# Patient Record
Sex: FEMALE | Race: WHITE | NOT HISPANIC OR LATINO | Employment: UNEMPLOYED | URBAN - METROPOLITAN AREA
[De-identification: names, ages, dates, MRNs, and addresses within clinical notes are randomized per-mention and may not be internally consistent; named-entity substitution may affect disease eponyms.]

---

## 2022-08-18 ENCOUNTER — HOSPITAL ENCOUNTER (EMERGENCY)
Facility: MEDICAL CENTER | Age: 47
End: 2022-08-18
Attending: EMERGENCY MEDICINE
Payer: MEDICAID

## 2022-08-18 VITALS
HEART RATE: 84 BPM | WEIGHT: 160.94 LBS | TEMPERATURE: 97.9 F | RESPIRATION RATE: 16 BRPM | DIASTOLIC BLOOD PRESSURE: 89 MMHG | HEIGHT: 64 IN | BODY MASS INDEX: 27.48 KG/M2 | SYSTOLIC BLOOD PRESSURE: 132 MMHG | OXYGEN SATURATION: 96 %

## 2022-08-18 DIAGNOSIS — N12 PYELONEPHRITIS: ICD-10-CM

## 2022-08-18 DIAGNOSIS — K72.10 CHRONIC LIVER FAILURE WITHOUT HEPATIC COMA (HCC): ICD-10-CM

## 2022-08-18 LAB
ALBUMIN SERPL BCP-MCNC: 4.1 G/DL (ref 3.2–4.9)
ALBUMIN/GLOB SERPL: 1.2 G/DL
ALP SERPL-CCNC: 126 U/L (ref 30–99)
ALT SERPL-CCNC: 59 U/L (ref 2–50)
AMMONIA PLAS-SCNC: 32 UMOL/L (ref 11–45)
ANION GAP SERPL CALC-SCNC: 16 MMOL/L (ref 7–16)
APPEARANCE UR: CLEAR
AST SERPL-CCNC: 158 U/L (ref 12–45)
BACTERIA #/AREA URNS HPF: ABNORMAL /HPF
BASOPHILS # BLD AUTO: 0.7 % (ref 0–1.8)
BASOPHILS # BLD: 0.08 K/UL (ref 0–0.12)
BILIRUB SERPL-MCNC: 1.4 MG/DL (ref 0.1–1.5)
BILIRUB UR QL STRIP.AUTO: NEGATIVE
BUN SERPL-MCNC: 4 MG/DL (ref 8–22)
CALCIUM SERPL-MCNC: 8.9 MG/DL (ref 8.5–10.5)
CHLORIDE SERPL-SCNC: 99 MMOL/L (ref 96–112)
CO2 SERPL-SCNC: 23 MMOL/L (ref 20–33)
COLOR UR: YELLOW
CREAT SERPL-MCNC: 0.62 MG/DL (ref 0.5–1.4)
EOSINOPHIL # BLD AUTO: 0.37 K/UL (ref 0–0.51)
EOSINOPHIL NFR BLD: 3.4 % (ref 0–6.9)
EPI CELLS #/AREA URNS HPF: ABNORMAL /HPF
ERYTHROCYTE [DISTWIDTH] IN BLOOD BY AUTOMATED COUNT: 60.9 FL (ref 35.9–50)
GFR SERPLBLD CREATININE-BSD FMLA CKD-EPI: 111 ML/MIN/1.73 M 2
GLOBULIN SER CALC-MCNC: 3.5 G/DL (ref 1.9–3.5)
GLUCOSE SERPL-MCNC: 98 MG/DL (ref 65–99)
GLUCOSE UR STRIP.AUTO-MCNC: NEGATIVE MG/DL
HCG SERPL QL: NEGATIVE
HCT VFR BLD AUTO: 41.8 % (ref 37–47)
HGB BLD-MCNC: 14.7 G/DL (ref 12–16)
HYALINE CASTS #/AREA URNS LPF: ABNORMAL /LPF
IMM GRANULOCYTES # BLD AUTO: 0.04 K/UL (ref 0–0.11)
IMM GRANULOCYTES NFR BLD AUTO: 0.4 % (ref 0–0.9)
KETONES UR STRIP.AUTO-MCNC: NEGATIVE MG/DL
LEUKOCYTE ESTERASE UR QL STRIP.AUTO: ABNORMAL
LIPASE SERPL-CCNC: 25 U/L (ref 11–82)
LYMPHOCYTES # BLD AUTO: 4.36 K/UL (ref 1–4.8)
LYMPHOCYTES NFR BLD: 40 % (ref 22–41)
MCH RBC QN AUTO: 36.8 PG (ref 27–33)
MCHC RBC AUTO-ENTMCNC: 35.2 G/DL (ref 33.6–35)
MCV RBC AUTO: 104.8 FL (ref 81.4–97.8)
MICRO URNS: ABNORMAL
MONOCYTES # BLD AUTO: 0.82 K/UL (ref 0–0.85)
MONOCYTES NFR BLD AUTO: 7.5 % (ref 0–13.4)
NEUTROPHILS # BLD AUTO: 5.23 K/UL (ref 2–7.15)
NEUTROPHILS NFR BLD: 48 % (ref 44–72)
NITRITE UR QL STRIP.AUTO: NEGATIVE
NRBC # BLD AUTO: 0 K/UL
NRBC BLD-RTO: 0 /100 WBC
PH UR STRIP.AUTO: 6.5 [PH] (ref 5–8)
PLATELET # BLD AUTO: 207 K/UL (ref 164–446)
PMV BLD AUTO: 10.5 FL (ref 9–12.9)
POTASSIUM SERPL-SCNC: 3.2 MMOL/L (ref 3.6–5.5)
PROT SERPL-MCNC: 7.6 G/DL (ref 6–8.2)
PROT UR QL STRIP: NEGATIVE MG/DL
RBC # BLD AUTO: 3.99 M/UL (ref 4.2–5.4)
RBC # URNS HPF: ABNORMAL /HPF
RBC UR QL AUTO: ABNORMAL
SODIUM SERPL-SCNC: 138 MMOL/L (ref 135–145)
SP GR UR STRIP.AUTO: 1
UROBILINOGEN UR STRIP.AUTO-MCNC: 0.2 MG/DL
WBC # BLD AUTO: 10.9 K/UL (ref 4.8–10.8)
WBC #/AREA URNS HPF: ABNORMAL /HPF

## 2022-08-18 PROCEDURE — A9270 NON-COVERED ITEM OR SERVICE: HCPCS | Performed by: EMERGENCY MEDICINE

## 2022-08-18 PROCEDURE — 96374 THER/PROPH/DIAG INJ IV PUSH: CPT

## 2022-08-18 PROCEDURE — 700102 HCHG RX REV CODE 250 W/ 637 OVERRIDE(OP): Performed by: EMERGENCY MEDICINE

## 2022-08-18 PROCEDURE — 99285 EMERGENCY DEPT VISIT HI MDM: CPT

## 2022-08-18 PROCEDURE — 84703 CHORIONIC GONADOTROPIN ASSAY: CPT

## 2022-08-18 PROCEDURE — 87077 CULTURE AEROBIC IDENTIFY: CPT

## 2022-08-18 PROCEDURE — 81001 URINALYSIS AUTO W/SCOPE: CPT

## 2022-08-18 PROCEDURE — 80053 COMPREHEN METABOLIC PANEL: CPT

## 2022-08-18 PROCEDURE — 87086 URINE CULTURE/COLONY COUNT: CPT

## 2022-08-18 PROCEDURE — 700111 HCHG RX REV CODE 636 W/ 250 OVERRIDE (IP): Mod: JW | Performed by: EMERGENCY MEDICINE

## 2022-08-18 PROCEDURE — 83690 ASSAY OF LIPASE: CPT

## 2022-08-18 PROCEDURE — 82140 ASSAY OF AMMONIA: CPT

## 2022-08-18 PROCEDURE — 85025 COMPLETE CBC W/AUTO DIFF WBC: CPT

## 2022-08-18 PROCEDURE — 36415 COLL VENOUS BLD VENIPUNCTURE: CPT

## 2022-08-18 PROCEDURE — 96375 TX/PRO/DX INJ NEW DRUG ADDON: CPT

## 2022-08-18 PROCEDURE — 87186 SC STD MICRODIL/AGAR DIL: CPT

## 2022-08-18 RX ORDER — CEFDINIR 300 MG/1
300 CAPSULE ORAL 2 TIMES DAILY
Qty: 20 CAPSULE | Refills: 0 | Status: ON HOLD | OUTPATIENT
Start: 2022-08-18 | End: 2022-09-15

## 2022-08-18 RX ORDER — POTASSIUM CHLORIDE 20 MEQ/1
40 TABLET, EXTENDED RELEASE ORAL DAILY
Status: DISCONTINUED | OUTPATIENT
Start: 2022-08-18 | End: 2022-08-18 | Stop reason: HOSPADM

## 2022-08-18 RX ORDER — CEFTRIAXONE 1 G/1
1 INJECTION, POWDER, FOR SOLUTION INTRAMUSCULAR; INTRAVENOUS ONCE
Status: COMPLETED | OUTPATIENT
Start: 2022-08-18 | End: 2022-08-18

## 2022-08-18 RX ORDER — KETOROLAC TROMETHAMINE 30 MG/ML
15 INJECTION, SOLUTION INTRAMUSCULAR; INTRAVENOUS ONCE
Status: COMPLETED | OUTPATIENT
Start: 2022-08-18 | End: 2022-08-18

## 2022-08-18 RX ORDER — ACETAMINOPHEN 325 MG/1
650 TABLET ORAL ONCE
Status: COMPLETED | OUTPATIENT
Start: 2022-08-18 | End: 2022-08-18

## 2022-08-18 RX ADMIN — CEFTRIAXONE SODIUM 1 G: 1 INJECTION, POWDER, FOR SOLUTION INTRAMUSCULAR; INTRAVENOUS at 12:13

## 2022-08-18 RX ADMIN — ACETAMINOPHEN 650 MG: 325 TABLET, FILM COATED ORAL at 11:56

## 2022-08-18 RX ADMIN — KETOROLAC TROMETHAMINE 15 MG: 30 INJECTION, SOLUTION INTRAMUSCULAR at 12:14

## 2022-08-18 RX ADMIN — POTASSIUM CHLORIDE 40 MEQ: 1500 TABLET, EXTENDED RELEASE ORAL at 11:56

## 2022-08-18 NOTE — ED NOTES
"Pt eloped to lobby and was sitting near check in. Patient escorted back to room. Per patient\" I went to the bathroom and couldn't find my way back \"  "

## 2022-08-18 NOTE — ED PROVIDER NOTES
ED Provider Note    Scribed for Janice Arango M.D. by Samanta Cazares. 8/18/2022, 11:33 AM.    Primary care provider: None.  Means of arrival: Walk in   History obtained from: patient   History limited by: none    CHIEF COMPLAINT  Chief Complaint   Patient presents with    Abdominal Pain    Flank Pain     Left flank pain x 1 week     Urinary Pain     odor    N/V       HPI  Hien Huynh is a 47 y.o. female who presents to the Emergency Department for flank pain onset 1-2 weeks ago. Her pain is located to bilateral flanks, but is worse on the left. She has associated foul smelling urine, vomiting, and abdominal pain. Denies fevers. Patient has a history of acute renal failure and states her mother and brother passed from real failure. 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 Patient is not currently taking any of regular medications since moving here from [place]. Endorses drinking alcohol and occasional marijuana. Denies drug use.     REVIEW OF SYSTEMS  Pertinent positives include flank pain, foul smelling urine, vomiting, and abdominal pain. Pertinent negatives include no fevers.  All other systems reviewed and negative.    PAST MEDICAL HISTORY   has a past medical history of Cirrhosis (HCC), Liver disease, and Renal disorder.    SURGICAL HISTORY  patient denies any surgical history    SOCIAL HISTORY  Social History     Tobacco Use    Smoking status: Some Days     Types: Cigarettes    Smokeless tobacco: Never   Vaping Use    Vaping Use: Never used   Substance Use Topics    Alcohol use: Yes     Alcohol/week: 7.2 oz     Types: 12 Glasses of wine per week     Comment: daily etoh    Drug use: Yes     Comment: occ marijuana      Social History     Substance and Sexual Activity   Drug Use Yes    Comment: occ marijuana       FAMILY HISTORY  History reviewed. No pertinent family history.    CURRENT MEDICATIONS  Home Medications       Reviewed by Rebecca SMITH  "DORCAS Weeks (Registered Nurse) on 08/18/22 at 1057  Med List Status: Complete     Medication Last Dose Status        Patient Irving Taking any Medications                           ALLERGIES  Allergies   Allergen Reactions    Penicillins Hives       PHYSICAL EXAM  VITAL SIGNS: BP (!) 141/89   Pulse 99   Temp 36.2 °C (97.2 °F) (Temporal)   Resp 18   Ht 1.626 m (5' 4\")   Wt 73 kg (160 lb 15 oz)   LMP 08/03/2022   SpO2 95%   BMI 27.62 kg/m²   Constitutional: Alert in no apparent distress.  HENT: No signs of trauma, Bilateral external ears normal, Nose normal.   Eyes: Pupils are equal and reactive, Conjunctiva normal, Non-icteric.   Neck: No stridor.   Cardiovascular: Regular rate and rhythm, Mild systolic murmur.  Thorax & Lungs: Normal breath sounds, No respiratory distress, No wheezing, No chest tenderness.   Abdomen: Bowel sounds normal, Soft, No tenderness, No masses, No peritoneal signs.  Skin: Warm, Dry, No erythema, No rash, no jaundice.  Back: No bony tenderness, minimal CVA tenderness.  Musculoskeletal:  No major deformities noted.  Neurologic: Alert, moving all extremities without difficulty, no focal deficits.    LABS  Labs Reviewed   CBC WITH DIFFERENTIAL - Abnormal; Notable for the following components:       Result Value    WBC 10.9 (*)     RBC 3.99 (*)     .8 (*)     MCH 36.8 (*)     MCHC 35.2 (*)     RDW 60.9 (*)     All other components within normal limits   COMP METABOLIC PANEL - Abnormal; Notable for the following components:    Potassium 3.2 (*)     Bun 4 (*)     AST(SGOT) 158 (*)     ALT(SGPT) 59 (*)     Alkaline Phosphatase 126 (*)     All other components within normal limits   URINALYSIS,CULTURE IF INDICATED - Abnormal; Notable for the following components:    Leukocyte Esterase Large (*)     Occult Blood Trace (*)     All other components within normal limits    Narrative:     Indication for culture:->Patient WITHOUT an indwelling Schulte  catheter in place with new onset of " Dysuria, Frequency,  Urgency, and/or Suprapubic pain   URINE MICROSCOPIC (W/UA) - Abnormal; Notable for the following components:    WBC 20-50 (*)     Bacteria Many (*)     All other components within normal limits    Narrative:     Indication for culture:->Patient WITHOUT an indwelling Schulte  catheter in place with new onset of Dysuria, Frequency,  Urgency, and/or Suprapubic pain   LIPASE   HCG QUAL SERUM   AMMONIA   ESTIMATED GFR   URINE CULTURE(NEW)    Narrative:     Indication for culture:->Patient WITHOUT an indwelling Schulte  catheter in place with new onset of Dysuria, Frequency,  Urgency, and/or Suprapubic pain   URINE DRUG SCREEN     All labs reviewed by me.    COURSE & MEDICAL DECISION MAKING  Pertinent Labs & Imaging studies reviewed. (See chart for details)    Differential diagnoses include but are not limited to: pyelonephritis, liver failure    11:33 AM - Patient seen and examined at bedside. I informed the patient she has a urine infection which will require treatment with IV antibiotics. We will attempt to obtain her records from prior hospitals to determine the best plan of care. Patient will be treated with Toradol 15 mg, tylenol 650 mg, and rocephin 2g. Ordered UDS, ammonia, CBC w/ diff, CMP, lipase, HCG qual, UA, and urine culture to evaluate her symptoms.     1:22 PM - Patient was reevaluated at bedside. Discussed lab results with the patient. Recommended ibuprofen for pain at home. Advised follow up with a PCP which I provided her with a referral for. Prescribed omnicef for her UTI. Discussed return precaution. Patient will be discharged at this time. She verbalizes agreement with discharge and plan of care.     Decision Making:  This is a 47 y.o. year old female who presents with back pain.  Patient has a complicated past medical history and is now in this area without any primary care or medications.  Work-up today reveals normal normal ammonia.  Minimally elevated LFTs consistent with her  history of chronic liver disease.  Lipase is normal urinalysis is indicative of UTI she was given antibiotics for this.  She tolerated Rocephin without difficulty she will be given Omnicef for antibiotic coverage.  She has no evidence of renal failure at this time.  She will be treated for pyelonephritis.  Records were able to be obtained from the outside facility however no current medication list was included in that paperwork.  Patient will be referred referred to outpatient primary care to restart regular medications.  She is agreeable to this plan.    The patient will return for new or worsening symptoms and is stable at the time of discharge. Patient was given return precautions. Patient and/or family member verbalizes understanding and will comply.    DISPOSITION:  Patient will be discharged home in stable condition.    FOLLOW UP:  [facility]  [address]  [facility] [address] [phone]  Follow up  Please call [facility] to establish with a Primary Care Provider. This office offers sliding fee scales based on income. Thank you.    [facility], Emergency Dept  [address] [facility] [address] [phone]    Return to the emergency department for worsening pain, fevers or other concerns.      OUTPATIENT MEDICATIONS:  Discharge Medication List as of 8/18/2022  1:45 PM        START taking these medications    Details   cefdinir (OMNICEF) 300 MG Cap Take 1 Capsule by mouth 2 times a day., Disp-20 Capsule, R-0, Normal              FINAL IMPRESSION  1. Chronic liver failure without hepatic coma (HCC)    2. Pyelonephritis           This dictation has been created using voice recognition software and/or scribes. The accuracy of the dictation is limited by the abilities of the software and the expertise of the scribes. I expect there may be some errors of grammar and possibly content. I made every attempt to manually correct the errors within my  dictation. However, errors related to voice recognition software and/or scribes may still exist and should be interpreted within the appropriate context.     I, Samanta Cazares (Scribe), am scribing for, and in the presence of, Janice Arango M.D..    Electronically signed by: Samanta Cazares (Scribe), 8/18/2022    IJanice M.D. personally performed the services described in this documentation, as scribed by Samanta Cazares in my presence, and it is both accurate and complete.    The note accurately reflects work and decisions made by me.  Janice Arango M.D.  8/18/2022  3:10 PM

## 2022-08-18 NOTE — ED NOTES
Pt self ambulated to treatment area with out difficulty. Pt is sitting upright in chair with no needs at this time. Chart up for ERP.

## 2022-08-18 NOTE — LETTER
8/22/2022               Hien Huynh  [address] [place] [id number]        Dear Hien (MR#[id number])    This letter is sent in regards to your recent visit to the [facility] Emergency Department on 8/18/2022. During the visit, tests were performed to assist the physician in your medical diagnosis. A review of your tests requires that we notify you of the following:    Your urine culture was POSITIVE for a bacteria called Escherichia coli. The antibiotic prescribed for you (cefdinir) should be active to treat this bacteria. It is important that you continue taking your antibiotic until it is finished.     Please feel free to contact me at the number below if you have any questions or concerns. Thank you for your cooperation in the matter.    Sincerely,  ED Culture Follow-Up Staff  Hien Sanchez, PharmD    [facility], Emergency Department  [address] [place] [id number] [phone] (ED Culture Line)

## 2022-08-18 NOTE — ED NOTES
PIV removed by patient. No bleeding noted.   DC home with friend with written and verbal instructions regarding f/u, activity and RX to  at pharmacy. Provided referrals, phone number updated in chart.  Verbalized understanding of all instructions, no further questions, ambulated out of ED with a steady gait with all belongings.

## 2022-08-18 NOTE — ED TRIAGE NOTES
.  Chief Complaint   Patient presents with    Abdominal Pain    Flank Pain     Left flank pain x 1 week     Urinary Pain     odor    N/V     Hepatic encephalopathy liver failure, has not been taking medications x 1 month d/t moving and traveling. Pt reports recently placed on hospice when in [place] but has since moved here with a friend. Unsure of meds last filled in [place] at a [facility] pt unsure of medications and states problems with confusion and aggression.

## 2022-08-20 LAB
BACTERIA UR CULT: ABNORMAL
SIGNIFICANT IND 70042: ABNORMAL
SITE SITE: ABNORMAL
SOURCE SOURCE: ABNORMAL

## 2022-08-22 NOTE — ED NOTES
"ED Positive Culture Follow-up/Notification Note:    Date: 8/22/22     Patient seen in the ED on 8/18/2022 for flank pain for 1-2 weeks with foul smelling urine and vomiting.   1. Chronic liver failure without hepatic coma (HCC)    2. Pyelonephritis     Given Rocephin 1 g IV in the ED.   Discharge Medication List as of 8/18/2022  1:45 PM        START taking these medications    Details   cefdinir (OMNICEF) 300 MG Cap Take 1 Capsule by mouth 2 times a day., Disp-20 Capsule, R-0, Normal             Allergies: Penicillins     Vitals:    08/18/22 1038 08/18/22 1244 08/18/22 1345   BP: (!) 141/89 (!) 137/90 132/89   Pulse: 99 79 84   Resp: 18 16 16   Temp: 36.2 °C (97.2 °F)  36.6 °C (97.9 °F)   TempSrc: Temporal  Temporal   SpO2: 95% 97% 96%   Weight: 73 kg (160 lb 15 oz)     Height: 1.626 m (5' 4\")         Final cultures:   Results       Procedure Component Value Units Date/Time    URINE CULTURE(NEW) [[id number]]  (Abnormal)  (Susceptibility) Collected: 08/18/22 1046    Order Status: Completed Specimen: Urine Updated: 08/20/22 0941     Significant Indicator POS     Source UR     Site -     Culture Result -      Streptococcus agalactiae (Group B)  ,000 cfu/mL        Escherichia coli  ,000 cfu/mL      Narrative:      Indication for culture:->Patient WITHOUT an indwelling Schulte  catheter in place with new onset of Dysuria, Frequency,  Urgency, and/or Suprapubic pain    Susceptibility       Escherichia coli (1)       Antibiotic Interpretation Microscan   Method Status      Amikacin  [*]  Sensitive <=16 mcg/mL ELSIE Final     Ampicillin Resistant >16 mcg/mL ELSIE Final     Amoxicillin/CA  [*]  Sensitive <=8/4 mcg/mL ELSIE Final     Aztreonam  [*]  Sensitive <=4 mcg/mL ELSIE Final     Ceftolozane+Tazobactam  [*]  Sensitive <=2 mcg/mL ELSIE Final     Ceftriaxone Sensitive <=1 mcg/mL ELSIE Final     Ceftazidime  [*]  Sensitive <=1 mcg/mL ELSIE Final     Cefazolin Sensitive <=2 mcg/mL ELSIE Final      Breakpoints when Cefazolin is " used for therapy of infections  other than uncomplicated UTIs due to Enterobacterales are as  follows:  ELSIE and Interpretation:  <=2 S  4 I  >=8 R          Ciprofloxacin Sensitive <=0.25 mcg/mL ELSIE Final     Cefepime Sensitive <=2 mcg/mL ELSIE Final     Cefuroxime Sensitive <=4 mcg/mL ELSIE Final     Ceftazidime+Avibactam  [*]  Sensitive <=4 mcg/mL ELSIE Final     Ampicillin/sulbactam Sensitive 8/4 mcg/mL ELSIE Final     Ertapenem  [*]  Sensitive <=0.5 mcg/mL ELSIE Final     Tobramycin Sensitive 4 mcg/mL ELSIE Final     Nitrofurantoin Sensitive <=32 mcg/mL ELSIE Final     Gentamicin Resistant >8 mcg/mL ELSIE Final     Imipenem  [*]  Sensitive <=1 mcg/mL ELSIE Final     Levofloxacin Sensitive <=0.5 mcg/mL ELSIE Final     Meropenem  [*]  Sensitive <=1 mcg/mL ELSIE Final     Meropenem/Vaborbactam  [*]  Sensitive <=2 mcg/mL ELSIE Final     Minocycline Sensitive <=4 mcg/mL ELSIE Final     Pip/Tazobactam Sensitive <=8 mcg/mL ELSIE Final     Trimeth/Sulfa Resistant >2/38 mcg/mL ELSIE Final     Tetracycline  [*]  Resistant >8 mcg/mL ELSIE Final     Tigecycline Sensitive <=2 mcg/mL ELSIE Final              [*]  Suppressed Antibiotic                   URINALYSIS,CULTURE IF INDICATED [[id number]]  (Abnormal) Collected: 08/18/22 1046    Order Status: Completed Specimen: Blood Updated: 08/18/22 1120     Color Yellow     Character Clear     Specific Gravity 1.005     Ph 6.5     Glucose Negative mg/dL      Ketones Negative mg/dL      Protein Negative mg/dL      Bilirubin Negative     Urobilinogen, Urine 0.2     Nitrite Negative     Leukocyte Esterase Large     Occult Blood Trace     Micro Urine Req Microscopic    Narrative:      Indication for culture:->Patient WITHOUT an indwelling Schulte  catheter in place with new onset of Dysuria, Frequency,  Urgency, and/or Suprapubic pain            Plan:   Appropriate antibiotic therapy prescribed. No changes required based upon culture result.  Sent letter to patient to notify of positive culture result and encourage  compliance with prescribed antibiotics.     Hien Sanchez, PharmD

## 2022-09-09 ENCOUNTER — APPOINTMENT (OUTPATIENT)
Dept: RADIOLOGY | Facility: MEDICAL CENTER | Age: 47
DRG: 441 | End: 2022-09-09
Attending: EMERGENCY MEDICINE
Payer: COMMERCIAL

## 2022-09-09 ENCOUNTER — HOSPITAL ENCOUNTER (INPATIENT)
Facility: MEDICAL CENTER | Age: 47
LOS: 6 days | DRG: 441 | End: 2022-09-15
Attending: EMERGENCY MEDICINE | Admitting: INTERNAL MEDICINE
Payer: COMMERCIAL

## 2022-09-09 DIAGNOSIS — K76.82 HEPATIC ENCEPHALOPATHY (HCC): ICD-10-CM

## 2022-09-09 DIAGNOSIS — R10.11 RIGHT UPPER QUADRANT ABDOMINAL PAIN: ICD-10-CM

## 2022-09-09 DIAGNOSIS — I10 HYPERTENSION, UNSPECIFIED TYPE: ICD-10-CM

## 2022-09-09 DIAGNOSIS — K72.00 ACUTE LIVER FAILURE WITHOUT HEPATIC COMA: ICD-10-CM

## 2022-09-09 DIAGNOSIS — E86.0 DEHYDRATION: ICD-10-CM

## 2022-09-09 DIAGNOSIS — K70.10 ALCOHOLIC HEPATITIS WITHOUT ASCITES: ICD-10-CM

## 2022-09-09 PROBLEM — E87.29 HIGH ANION GAP METABOLIC ACIDOSIS: Status: ACTIVE | Noted: 2022-09-09

## 2022-09-09 PROBLEM — R10.9 ABDOMINAL PAIN: Status: ACTIVE | Noted: 2022-09-09

## 2022-09-09 PROBLEM — R11.2 NAUSEA AND VOMITING: Status: ACTIVE | Noted: 2022-09-09

## 2022-09-09 LAB
ALBUMIN SERPL BCP-MCNC: 4.2 G/DL (ref 3.2–4.9)
ALBUMIN/GLOB SERPL: 1.2 G/DL
ALP SERPL-CCNC: 123 U/L (ref 30–99)
ALT SERPL-CCNC: 93 U/L (ref 2–50)
AMMONIA PLAS-SCNC: 64 UMOL/L (ref 11–45)
AMPHET UR QL SCN: NEGATIVE
ANION GAP SERPL CALC-SCNC: 33 MMOL/L (ref 7–16)
ANISOCYTOSIS BLD QL SMEAR: ABNORMAL
AST SERPL-CCNC: 314 U/L (ref 12–45)
B-OH-BUTYR SERPL-MCNC: >8 MMOL/L (ref 0.02–0.27)
BARBITURATES UR QL SCN: NEGATIVE
BASOPHILS # BLD AUTO: 1.1 % (ref 0–1.8)
BASOPHILS # BLD: 0.09 K/UL (ref 0–0.12)
BENZODIAZ UR QL SCN: NEGATIVE
BILIRUB CONJ SERPL-MCNC: 4.3 MG/DL (ref 0.1–0.5)
BILIRUB SERPL-MCNC: 5.9 MG/DL (ref 0.1–1.5)
BLOOD CULTURE HOLD CXBCH: NORMAL
BUN SERPL-MCNC: 6 MG/DL (ref 8–22)
BZE UR QL SCN: NEGATIVE
CALCIUM SERPL-MCNC: 8.5 MG/DL (ref 8.5–10.5)
CANNABINOIDS UR QL SCN: POSITIVE
CHLORIDE SERPL-SCNC: 94 MMOL/L (ref 96–112)
CO2 SERPL-SCNC: 11 MMOL/L (ref 20–33)
COMMENT 1642: NORMAL
CREAT SERPL-MCNC: 0.47 MG/DL (ref 0.5–1.4)
EOSINOPHIL # BLD AUTO: 0.02 K/UL (ref 0–0.51)
EOSINOPHIL NFR BLD: 0.2 % (ref 0–6.9)
ERYTHROCYTE [DISTWIDTH] IN BLOOD BY AUTOMATED COUNT: 66.8 FL (ref 35.9–50)
ETHANOL BLD-MCNC: <10.1 MG/DL
GFR SERPLBLD CREATININE-BSD FMLA CKD-EPI: 119 ML/MIN/1.73 M 2
GGT SERPL-CCNC: 568 U/L (ref 7–34)
GLOBULIN SER CALC-MCNC: 3.4 G/DL (ref 1.9–3.5)
GLUCOSE BLD STRIP.AUTO-MCNC: 56 MG/DL (ref 65–99)
GLUCOSE BLD STRIP.AUTO-MCNC: 73 MG/DL (ref 65–99)
GLUCOSE SERPL-MCNC: 71 MG/DL (ref 65–99)
HAV IGM SERPL QL IA: NORMAL
HBV CORE IGM SER QL: NORMAL
HBV SURFACE AG SER QL: NORMAL
HCT VFR BLD AUTO: 33.2 % (ref 37–47)
HCV AB SER QL: NORMAL
HGB BLD-MCNC: 11.2 G/DL (ref 12–16)
IMM GRANULOCYTES # BLD AUTO: 0.03 K/UL (ref 0–0.11)
IMM GRANULOCYTES NFR BLD AUTO: 0.4 % (ref 0–0.9)
INR PPP: 1.24 (ref 0.87–1.13)
LACTATE SERPL-SCNC: 2.4 MMOL/L (ref 0.5–2)
LIPASE SERPL-CCNC: 64 U/L (ref 11–82)
LYMPHOCYTES # BLD AUTO: 1.27 K/UL (ref 1–4.8)
LYMPHOCYTES NFR BLD: 15.5 % (ref 22–41)
MACROCYTES BLD QL SMEAR: ABNORMAL
MCH RBC QN AUTO: 38.8 PG (ref 27–33)
MCHC RBC AUTO-ENTMCNC: 33.7 G/DL (ref 33.6–35)
MCV RBC AUTO: 114.9 FL (ref 81.4–97.8)
METHADONE UR QL SCN: NEGATIVE
MONOCYTES # BLD AUTO: 0.59 K/UL (ref 0–0.85)
MONOCYTES NFR BLD AUTO: 7.2 % (ref 0–13.4)
MORPHOLOGY BLD-IMP: NORMAL
NEUTROPHILS # BLD AUTO: 6.18 K/UL (ref 2–7.15)
NEUTROPHILS NFR BLD: 75.6 % (ref 44–72)
NRBC # BLD AUTO: 0 K/UL
NRBC BLD-RTO: 0 /100 WBC
OPIATES UR QL SCN: POSITIVE
OXYCODONE UR QL SCN: NEGATIVE
PCP UR QL SCN: NEGATIVE
PLATELET # BLD AUTO: 194 K/UL (ref 164–446)
PLATELET BLD QL SMEAR: NORMAL
PMV BLD AUTO: 11.5 FL (ref 9–12.9)
POTASSIUM SERPL-SCNC: 3.8 MMOL/L (ref 3.6–5.5)
PROPOXYPH UR QL SCN: NEGATIVE
PROT SERPL-MCNC: 7.6 G/DL (ref 6–8.2)
PROTHROMBIN TIME: 15.4 SEC (ref 12–14.6)
RBC # BLD AUTO: 2.89 M/UL (ref 4.2–5.4)
RBC BLD AUTO: PRESENT
SODIUM SERPL-SCNC: 138 MMOL/L (ref 135–145)
WBC # BLD AUTO: 8.2 K/UL (ref 4.8–10.8)

## 2022-09-09 PROCEDURE — 80053 COMPREHEN METABOLIC PANEL: CPT

## 2022-09-09 PROCEDURE — 82010 KETONE BODYS QUAN: CPT

## 2022-09-09 PROCEDURE — 80307 DRUG TEST PRSMV CHEM ANLYZR: CPT

## 2022-09-09 PROCEDURE — 83883 ASSAY NEPHELOMETRY NOT SPEC: CPT

## 2022-09-09 PROCEDURE — 76705 ECHO EXAM OF ABDOMEN: CPT

## 2022-09-09 PROCEDURE — 99223 1ST HOSP IP/OBS HIGH 75: CPT | Performed by: INTERNAL MEDICINE

## 2022-09-09 PROCEDURE — 84450 TRANSFERASE (AST) (SGOT): CPT

## 2022-09-09 PROCEDURE — 36415 COLL VENOUS BLD VENIPUNCTURE: CPT

## 2022-09-09 PROCEDURE — 82140 ASSAY OF AMMONIA: CPT

## 2022-09-09 PROCEDURE — 84460 ALANINE AMINO (ALT) (SGPT): CPT

## 2022-09-09 PROCEDURE — 83605 ASSAY OF LACTIC ACID: CPT

## 2022-09-09 PROCEDURE — 96374 THER/PROPH/DIAG INJ IV PUSH: CPT

## 2022-09-09 PROCEDURE — 85049 AUTOMATED PLATELET COUNT: CPT

## 2022-09-09 PROCEDURE — 80074 ACUTE HEPATITIS PANEL: CPT

## 2022-09-09 PROCEDURE — 83690 ASSAY OF LIPASE: CPT

## 2022-09-09 PROCEDURE — 85610 PROTHROMBIN TIME: CPT

## 2022-09-09 PROCEDURE — 700105 HCHG RX REV CODE 258: Performed by: INTERNAL MEDICINE

## 2022-09-09 PROCEDURE — 700111 HCHG RX REV CODE 636 W/ 250 OVERRIDE (IP): Performed by: INTERNAL MEDICINE

## 2022-09-09 PROCEDURE — 82962 GLUCOSE BLOOD TEST: CPT

## 2022-09-09 PROCEDURE — 85025 COMPLETE CBC W/AUTO DIFF WBC: CPT

## 2022-09-09 PROCEDURE — 82248 BILIRUBIN DIRECT: CPT

## 2022-09-09 PROCEDURE — 700102 HCHG RX REV CODE 250 W/ 637 OVERRIDE(OP): Performed by: INTERNAL MEDICINE

## 2022-09-09 PROCEDURE — 82077 ASSAY SPEC XCP UR&BREATH IA: CPT

## 2022-09-09 PROCEDURE — 99285 EMERGENCY DEPT VISIT HI MDM: CPT

## 2022-09-09 PROCEDURE — 82977 ASSAY OF GGT: CPT

## 2022-09-09 PROCEDURE — A9270 NON-COVERED ITEM OR SERVICE: HCPCS | Performed by: INTERNAL MEDICINE

## 2022-09-09 PROCEDURE — 84520 ASSAY OF UREA NITROGEN: CPT

## 2022-09-09 PROCEDURE — 700105 HCHG RX REV CODE 258: Performed by: EMERGENCY MEDICINE

## 2022-09-09 PROCEDURE — 700111 HCHG RX REV CODE 636 W/ 250 OVERRIDE (IP): Performed by: EMERGENCY MEDICINE

## 2022-09-09 PROCEDURE — 770001 HCHG ROOM/CARE - MED/SURG/GYN PRIV*

## 2022-09-09 PROCEDURE — 96375 TX/PRO/DX INJ NEW DRUG ADDON: CPT

## 2022-09-09 RX ORDER — SODIUM CHLORIDE 9 MG/ML
1000 INJECTION, SOLUTION INTRAVENOUS ONCE
Status: COMPLETED | OUTPATIENT
Start: 2022-09-09 | End: 2022-09-09

## 2022-09-09 RX ORDER — POLYETHYLENE GLYCOL 3350 17 G/17G
1 POWDER, FOR SOLUTION ORAL
Status: DISCONTINUED | OUTPATIENT
Start: 2022-09-09 | End: 2022-09-15 | Stop reason: HOSPADM

## 2022-09-09 RX ORDER — AMLODIPINE BESYLATE 5 MG/1
5 TABLET ORAL
Status: DISCONTINUED | OUTPATIENT
Start: 2022-09-10 | End: 2022-09-15 | Stop reason: HOSPADM

## 2022-09-09 RX ORDER — MORPHINE SULFATE 4 MG/ML
4 INJECTION INTRAVENOUS ONCE
Status: COMPLETED | OUTPATIENT
Start: 2022-09-09 | End: 2022-09-09

## 2022-09-09 RX ORDER — ONDANSETRON 2 MG/ML
4 INJECTION INTRAMUSCULAR; INTRAVENOUS ONCE
Status: COMPLETED | OUTPATIENT
Start: 2022-09-09 | End: 2022-09-09

## 2022-09-09 RX ORDER — OXYCODONE HYDROCHLORIDE 5 MG/1
5 TABLET ORAL
Status: DISCONTINUED | OUTPATIENT
Start: 2022-09-09 | End: 2022-09-15 | Stop reason: HOSPADM

## 2022-09-09 RX ORDER — LACTULOSE 20 G/30ML
30 SOLUTION ORAL 3 TIMES DAILY
Status: DISCONTINUED | OUTPATIENT
Start: 2022-09-09 | End: 2022-09-13

## 2022-09-09 RX ORDER — SODIUM CHLORIDE 9 MG/ML
INJECTION, SOLUTION INTRAVENOUS CONTINUOUS
Status: DISCONTINUED | OUTPATIENT
Start: 2022-09-09 | End: 2022-09-10

## 2022-09-09 RX ORDER — SUCRALFATE 1 G/1
1 TABLET ORAL EVERY 6 HOURS
Status: DISCONTINUED | OUTPATIENT
Start: 2022-09-09 | End: 2022-09-15 | Stop reason: HOSPADM

## 2022-09-09 RX ORDER — PROCHLORPERAZINE EDISYLATE 5 MG/ML
5-10 INJECTION INTRAMUSCULAR; INTRAVENOUS EVERY 4 HOURS PRN
Status: DISCONTINUED | OUTPATIENT
Start: 2022-09-09 | End: 2022-09-15 | Stop reason: HOSPADM

## 2022-09-09 RX ORDER — ONDANSETRON 4 MG/1
4 TABLET, ORALLY DISINTEGRATING ORAL EVERY 4 HOURS PRN
Status: DISCONTINUED | OUTPATIENT
Start: 2022-09-09 | End: 2022-09-15 | Stop reason: HOSPADM

## 2022-09-09 RX ORDER — BISACODYL 10 MG
10 SUPPOSITORY, RECTAL RECTAL
Status: DISCONTINUED | OUTPATIENT
Start: 2022-09-09 | End: 2022-09-15 | Stop reason: HOSPADM

## 2022-09-09 RX ORDER — OXYCODONE HYDROCHLORIDE 10 MG/1
10 TABLET ORAL
Status: DISCONTINUED | OUTPATIENT
Start: 2022-09-09 | End: 2022-09-15 | Stop reason: HOSPADM

## 2022-09-09 RX ORDER — ONDANSETRON 2 MG/ML
4 INJECTION INTRAMUSCULAR; INTRAVENOUS EVERY 4 HOURS PRN
Status: DISCONTINUED | OUTPATIENT
Start: 2022-09-09 | End: 2022-09-15 | Stop reason: HOSPADM

## 2022-09-09 RX ORDER — PROMETHAZINE HYDROCHLORIDE 25 MG/1
12.5-25 TABLET ORAL EVERY 4 HOURS PRN
Status: DISCONTINUED | OUTPATIENT
Start: 2022-09-09 | End: 2022-09-15 | Stop reason: HOSPADM

## 2022-09-09 RX ORDER — AMOXICILLIN 250 MG
2 CAPSULE ORAL 2 TIMES DAILY
Status: DISCONTINUED | OUTPATIENT
Start: 2022-09-10 | End: 2022-09-13

## 2022-09-09 RX ORDER — HYDROMORPHONE HYDROCHLORIDE 1 MG/ML
0.5 INJECTION, SOLUTION INTRAMUSCULAR; INTRAVENOUS; SUBCUTANEOUS
Status: DISCONTINUED | OUTPATIENT
Start: 2022-09-09 | End: 2022-09-10

## 2022-09-09 RX ORDER — PROMETHAZINE HYDROCHLORIDE 25 MG/1
12.5-25 SUPPOSITORY RECTAL EVERY 4 HOURS PRN
Status: DISCONTINUED | OUTPATIENT
Start: 2022-09-09 | End: 2022-09-15 | Stop reason: HOSPADM

## 2022-09-09 RX ORDER — HYDRALAZINE HYDROCHLORIDE 20 MG/ML
20 INJECTION INTRAMUSCULAR; INTRAVENOUS EVERY 6 HOURS PRN
Status: DISCONTINUED | OUTPATIENT
Start: 2022-09-09 | End: 2022-09-15 | Stop reason: HOSPADM

## 2022-09-09 RX ADMIN — SODIUM CHLORIDE 1000 ML: 9 INJECTION, SOLUTION INTRAVENOUS at 19:42

## 2022-09-09 RX ADMIN — ONDANSETRON 4 MG: 2 INJECTION INTRAMUSCULAR; INTRAVENOUS at 21:49

## 2022-09-09 RX ADMIN — MORPHINE SULFATE 4 MG: 4 INJECTION INTRAVENOUS at 18:04

## 2022-09-09 RX ADMIN — OXYCODONE HYDROCHLORIDE 10 MG: 10 TABLET ORAL at 20:54

## 2022-09-09 RX ADMIN — SODIUM CHLORIDE: 9 INJECTION, SOLUTION INTRAVENOUS at 21:50

## 2022-09-09 RX ADMIN — FAMOTIDINE 20 MG: 10 INJECTION, SOLUTION INTRAVENOUS at 20:53

## 2022-09-09 RX ADMIN — SODIUM CHLORIDE 1000 ML: 9 INJECTION, SOLUTION INTRAVENOUS at 18:04

## 2022-09-09 RX ADMIN — HYDROMORPHONE HYDROCHLORIDE 0.5 MG: 1 INJECTION, SOLUTION INTRAMUSCULAR; INTRAVENOUS; SUBCUTANEOUS at 22:09

## 2022-09-09 RX ADMIN — ONDANSETRON 4 MG: 2 INJECTION INTRAMUSCULAR; INTRAVENOUS at 18:04

## 2022-09-09 ASSESSMENT — ENCOUNTER SYMPTOMS
FEVER: 0
NAUSEA: 1
FLANK PAIN: 0
ABDOMINAL PAIN: 1
NERVOUS/ANXIOUS: 0
CHILLS: 1
HEARTBURN: 0
COUGH: 0
FOCAL WEAKNESS: 0
BLURRED VISION: 0
HALLUCINATIONS: 0
POLYDIPSIA: 0
HEADACHES: 0
PHOTOPHOBIA: 0
SPUTUM PRODUCTION: 0
PALPITATIONS: 0
HEMOPTYSIS: 0
NECK PAIN: 0
DIARRHEA: 1
SPEECH CHANGE: 0
TREMORS: 0
BACK PAIN: 0
VOMITING: 1
DOUBLE VISION: 0
WEAKNESS: 1
ORTHOPNEA: 0
BRUISES/BLEEDS EASILY: 0
WEIGHT LOSS: 1

## 2022-09-09 ASSESSMENT — LIFESTYLE VARIABLES: SUBSTANCE_ABUSE: 0

## 2022-09-09 ASSESSMENT — PAIN DESCRIPTION - PAIN TYPE: TYPE: ACUTE PAIN

## 2022-09-09 ASSESSMENT — FIBROSIS 4 INDEX
FIB4 SCORE: 7.72
FIB4 SCORE: 7.72

## 2022-09-10 ENCOUNTER — APPOINTMENT (OUTPATIENT)
Dept: RADIOLOGY | Facility: MEDICAL CENTER | Age: 47
DRG: 441 | End: 2022-09-10
Attending: INTERNAL MEDICINE
Payer: COMMERCIAL

## 2022-09-10 PROBLEM — N12 PYELONEPHRITIS: Status: ACTIVE | Noted: 2022-09-10

## 2022-09-10 PROBLEM — E16.2 HYPOGLYCEMIA: Status: ACTIVE | Noted: 2022-09-10

## 2022-09-10 LAB
ALBUMIN SERPL BCP-MCNC: 3.8 G/DL (ref 3.2–4.9)
ALBUMIN/GLOB SERPL: 1.4 G/DL
ALP SERPL-CCNC: 104 U/L (ref 30–99)
ALT SERPL-CCNC: 75 U/L (ref 2–50)
ANION GAP SERPL CALC-SCNC: 19 MMOL/L (ref 7–16)
APPEARANCE UR: CLEAR
AST SERPL-CCNC: 211 U/L (ref 12–45)
BACTERIA #/AREA URNS HPF: NEGATIVE /HPF
BASOPHILS # BLD AUTO: 0.8 % (ref 0–1.8)
BASOPHILS # BLD: 0.06 K/UL (ref 0–0.12)
BILIRUB SERPL-MCNC: 4.7 MG/DL (ref 0.1–1.5)
BILIRUB UR QL STRIP.AUTO: ABNORMAL
BUN SERPL-MCNC: 5 MG/DL (ref 8–22)
CALCIUM SERPL-MCNC: 7.8 MG/DL (ref 8.5–10.5)
CHLORIDE SERPL-SCNC: 98 MMOL/L (ref 96–112)
CO2 SERPL-SCNC: 17 MMOL/L (ref 20–33)
COLOR UR: ABNORMAL
CREAT SERPL-MCNC: 0.61 MG/DL (ref 0.5–1.4)
EOSINOPHIL # BLD AUTO: 0.16 K/UL (ref 0–0.51)
EOSINOPHIL NFR BLD: 2.1 % (ref 0–6.9)
EPI CELLS #/AREA URNS HPF: NEGATIVE /HPF
ERYTHROCYTE [DISTWIDTH] IN BLOOD BY AUTOMATED COUNT: 63.7 FL (ref 35.9–50)
GFR SERPLBLD CREATININE-BSD FMLA CKD-EPI: 111 ML/MIN/1.73 M 2
GLOBULIN SER CALC-MCNC: 2.8 G/DL (ref 1.9–3.5)
GLUCOSE BLD STRIP.AUTO-MCNC: 104 MG/DL (ref 65–99)
GLUCOSE BLD STRIP.AUTO-MCNC: 157 MG/DL (ref 65–99)
GLUCOSE BLD STRIP.AUTO-MCNC: 66 MG/DL (ref 65–99)
GLUCOSE SERPL-MCNC: 83 MG/DL (ref 65–99)
GLUCOSE UR STRIP.AUTO-MCNC: NEGATIVE MG/DL
HCT VFR BLD AUTO: 33.5 % (ref 37–47)
HGB BLD-MCNC: 11.5 G/DL (ref 12–16)
HYALINE CASTS #/AREA URNS LPF: ABNORMAL /LPF
IMM GRANULOCYTES # BLD AUTO: 0.04 K/UL (ref 0–0.11)
IMM GRANULOCYTES NFR BLD AUTO: 0.5 % (ref 0–0.9)
KETONES UR STRIP.AUTO-MCNC: 40 MG/DL
LEUKOCYTE ESTERASE UR QL STRIP.AUTO: ABNORMAL
LYMPHOCYTES # BLD AUTO: 2.35 K/UL (ref 1–4.8)
LYMPHOCYTES NFR BLD: 31.3 % (ref 22–41)
MCH RBC QN AUTO: 39 PG (ref 27–33)
MCHC RBC AUTO-ENTMCNC: 34.3 G/DL (ref 33.6–35)
MCV RBC AUTO: 113.6 FL (ref 81.4–97.8)
MICRO URNS: ABNORMAL
MONOCYTES # BLD AUTO: 0.47 K/UL (ref 0–0.85)
MONOCYTES NFR BLD AUTO: 6.3 % (ref 0–13.4)
NEUTROPHILS # BLD AUTO: 4.42 K/UL (ref 2–7.15)
NEUTROPHILS NFR BLD: 59 % (ref 44–72)
NITRITE UR QL STRIP.AUTO: POSITIVE
NRBC # BLD AUTO: 0 K/UL
NRBC BLD-RTO: 0 /100 WBC
PH UR STRIP.AUTO: 6.5 [PH] (ref 5–8)
PLATELET # BLD AUTO: 174 K/UL (ref 164–446)
PMV BLD AUTO: 11.1 FL (ref 9–12.9)
POTASSIUM SERPL-SCNC: 3.3 MMOL/L (ref 3.6–5.5)
PROT SERPL-MCNC: 6.6 G/DL (ref 6–8.2)
PROT UR QL STRIP: NEGATIVE MG/DL
RBC # BLD AUTO: 2.95 M/UL (ref 4.2–5.4)
RBC # URNS HPF: ABNORMAL /HPF
RBC UR QL AUTO: ABNORMAL
SODIUM SERPL-SCNC: 134 MMOL/L (ref 135–145)
SP GR UR STRIP.AUTO: 1.01
UROBILINOGEN UR STRIP.AUTO-MCNC: 2 MG/DL
WBC # BLD AUTO: 7.5 K/UL (ref 4.8–10.8)
WBC #/AREA URNS HPF: ABNORMAL /HPF

## 2022-09-10 PROCEDURE — 700102 HCHG RX REV CODE 250 W/ 637 OVERRIDE(OP): Performed by: INTERNAL MEDICINE

## 2022-09-10 PROCEDURE — 700105 HCHG RX REV CODE 258

## 2022-09-10 PROCEDURE — A9270 NON-COVERED ITEM OR SERVICE: HCPCS | Performed by: INTERNAL MEDICINE

## 2022-09-10 PROCEDURE — 770001 HCHG ROOM/CARE - MED/SURG/GYN PRIV*

## 2022-09-10 PROCEDURE — 80053 COMPREHEN METABOLIC PANEL: CPT

## 2022-09-10 PROCEDURE — 700111 HCHG RX REV CODE 636 W/ 250 OVERRIDE (IP)

## 2022-09-10 PROCEDURE — 81001 URINALYSIS AUTO W/SCOPE: CPT

## 2022-09-10 PROCEDURE — 700111 HCHG RX REV CODE 636 W/ 250 OVERRIDE (IP): Performed by: INTERNAL MEDICINE

## 2022-09-10 PROCEDURE — 85025 COMPLETE CBC W/AUTO DIFF WBC: CPT

## 2022-09-10 PROCEDURE — 36415 COLL VENOUS BLD VENIPUNCTURE: CPT

## 2022-09-10 PROCEDURE — 74181 MRI ABDOMEN W/O CONTRAST: CPT

## 2022-09-10 PROCEDURE — 82962 GLUCOSE BLOOD TEST: CPT

## 2022-09-10 PROCEDURE — 99233 SBSQ HOSP IP/OBS HIGH 50: CPT | Performed by: INTERNAL MEDICINE

## 2022-09-10 RX ORDER — DEXTROSE MONOHYDRATE 50 MG/ML
INJECTION, SOLUTION INTRAVENOUS CONTINUOUS
Status: DISCONTINUED | OUTPATIENT
Start: 2022-09-10 | End: 2022-09-13

## 2022-09-10 RX ORDER — MORPHINE SULFATE 4 MG/ML
2-4 INJECTION INTRAVENOUS
Status: DISCONTINUED | OUTPATIENT
Start: 2022-09-10 | End: 2022-09-11

## 2022-09-10 RX ORDER — POTASSIUM CHLORIDE 7.45 MG/ML
10 INJECTION INTRAVENOUS
Status: COMPLETED | OUTPATIENT
Start: 2022-09-10 | End: 2022-09-11

## 2022-09-10 RX ORDER — POTASSIUM CHLORIDE 7.45 MG/ML
10 INJECTION INTRAVENOUS
Status: DISPENSED | OUTPATIENT
Start: 2022-09-10 | End: 2022-09-10

## 2022-09-10 RX ADMIN — POTASSIUM CHLORIDE 10 MEQ: 7.46 INJECTION, SOLUTION INTRAVENOUS at 10:27

## 2022-09-10 RX ADMIN — POTASSIUM CHLORIDE 10 MEQ: 7.46 INJECTION, SOLUTION INTRAVENOUS at 20:28

## 2022-09-10 RX ADMIN — FAMOTIDINE 20 MG: 10 INJECTION, SOLUTION INTRAVENOUS at 16:51

## 2022-09-10 RX ADMIN — THIAMINE HYDROCHLORIDE 100 MG: 100 INJECTION, SOLUTION INTRAMUSCULAR; INTRAVENOUS at 05:25

## 2022-09-10 RX ADMIN — PROCHLORPERAZINE EDISYLATE 5 MG: 5 INJECTION INTRAMUSCULAR; INTRAVENOUS at 00:41

## 2022-09-10 RX ADMIN — MORPHINE SULFATE 4 MG: 4 INJECTION INTRAVENOUS at 20:24

## 2022-09-10 RX ADMIN — MORPHINE SULFATE 4 MG: 4 INJECTION INTRAVENOUS at 23:50

## 2022-09-10 RX ADMIN — POTASSIUM CHLORIDE 10 MEQ: 7.46 INJECTION, SOLUTION INTRAVENOUS at 22:15

## 2022-09-10 RX ADMIN — ONDANSETRON 4 MG: 2 INJECTION INTRAMUSCULAR; INTRAVENOUS at 16:51

## 2022-09-10 RX ADMIN — ONDANSETRON 4 MG: 2 INJECTION INTRAMUSCULAR; INTRAVENOUS at 11:05

## 2022-09-10 RX ADMIN — FAMOTIDINE 20 MG: 10 INJECTION, SOLUTION INTRAVENOUS at 05:25

## 2022-09-10 RX ADMIN — HYDROMORPHONE HYDROCHLORIDE 0.5 MG: 1 INJECTION, SOLUTION INTRAMUSCULAR; INTRAVENOUS; SUBCUTANEOUS at 10:34

## 2022-09-10 RX ADMIN — POTASSIUM CHLORIDE 10 MEQ: 7.46 INJECTION, SOLUTION INTRAVENOUS at 14:10

## 2022-09-10 RX ADMIN — ONDANSETRON 4 MG: 2 INJECTION INTRAMUSCULAR; INTRAVENOUS at 22:15

## 2022-09-10 RX ADMIN — DEXTROSE MONOHYDRATE: 50 INJECTION, SOLUTION INTRAVENOUS at 02:40

## 2022-09-10 RX ADMIN — HYDROMORPHONE HYDROCHLORIDE 0.5 MG: 1 INJECTION, SOLUTION INTRAMUSCULAR; INTRAVENOUS; SUBCUTANEOUS at 14:09

## 2022-09-10 RX ADMIN — SUCRALFATE 1 G: 1 TABLET ORAL at 00:42

## 2022-09-10 RX ADMIN — DEXTROSE MONOHYDRATE 25 G: 100 INJECTION, SOLUTION INTRAVENOUS at 01:23

## 2022-09-10 RX ADMIN — LACTULOSE 30 ML: 20 SOLUTION ORAL at 16:51

## 2022-09-10 RX ADMIN — CEFTRIAXONE SODIUM 1 G: 10 INJECTION, POWDER, FOR SOLUTION INTRAVENOUS at 00:40

## 2022-09-10 ASSESSMENT — LIFESTYLE VARIABLES
ALCOHOL_USE: YES
TOTAL SCORE: 0
AVERAGE NUMBER OF DAYS PER WEEK YOU HAVE A DRINK CONTAINING ALCOHOL: 2
EVER HAD A DRINK FIRST THING IN THE MORNING TO STEADY YOUR NERVES TO GET RID OF A HANGOVER: NO
HAVE PEOPLE ANNOYED YOU BY CRITICIZING YOUR DRINKING: NO
CONSUMPTION TOTAL: POSITIVE
EVER FELT BAD OR GUILTY ABOUT YOUR DRINKING: NO
DOES PATIENT WANT TO TALK TO SOMEONE ABOUT QUITTING: YES
HOW MANY TIMES IN THE PAST YEAR HAVE YOU HAD 5 OR MORE DRINKS IN A DAY: 7
HAVE YOU EVER FELT YOU SHOULD CUT DOWN ON YOUR DRINKING: NO
TOTAL SCORE: 0
TOTAL SCORE: 0
ON A TYPICAL DAY WHEN YOU DRINK ALCOHOL HOW MANY DRINKS DO YOU HAVE: 3
DOES PATIENT WANT TO STOP DRINKING: YES

## 2022-09-10 ASSESSMENT — ENCOUNTER SYMPTOMS
ROS GI COMMENTS: RUQ PAIN
EYES NEGATIVE: 1
NEUROLOGICAL NEGATIVE: 1
ABDOMINAL PAIN: 1
MUSCULOSKELETAL NEGATIVE: 1
PSYCHIATRIC NEGATIVE: 1
NAUSEA: 1
CARDIOVASCULAR NEGATIVE: 1
RESPIRATORY NEGATIVE: 1

## 2022-09-10 ASSESSMENT — COGNITIVE AND FUNCTIONAL STATUS - GENERAL
MOBILITY SCORE: 24
DAILY ACTIVITIY SCORE: 24
SUGGESTED CMS G CODE MODIFIER MOBILITY: CH
SUGGESTED CMS G CODE MODIFIER DAILY ACTIVITY: CH

## 2022-09-10 ASSESSMENT — PATIENT HEALTH QUESTIONNAIRE - PHQ9
1. LITTLE INTEREST OR PLEASURE IN DOING THINGS: NOT AT ALL
2. FEELING DOWN, DEPRESSED, IRRITABLE, OR HOPELESS: NOT AT ALL
SUM OF ALL RESPONSES TO PHQ9 QUESTIONS 1 AND 2: 0

## 2022-09-10 ASSESSMENT — PAIN DESCRIPTION - PAIN TYPE: TYPE: ACUTE PAIN

## 2022-09-10 ASSESSMENT — FIBROSIS 4 INDEX: FIB4 SCORE: 6.44

## 2022-09-10 NOTE — ED NOTES
Report given to CONCEPCION Garcia on Kathie 6. Rapid Response nurses transporting patient to floor in stable condition. FSBG 56, floor and RRT transport aware, patient currently asymptomatic for hypoglycemia.   BP (!) 177/102   Pulse (!) 107   Temp 37.1 °C (98.8 °F)   Resp 20   Wt 70 kg (154 lb 5.2 oz)   SpO2 95%   BMI 26.49 kg/m²

## 2022-09-10 NOTE — ED NOTES
Med rec completed per patient  Allergies reviewed    Patient states she takes no medications, RX or OTC    Patient states she was given Cefdinir, she does not remember when she started it, but she states she stopped taking it as she was unable to stop vomiting. Records show it was originally prescribed 08/18/2022  Patient unable to advise when she stopped taking the medication or for how many days she took it.

## 2022-09-10 NOTE — ASSESSMENT & PLAN NOTE
Hepatitis discrimination score 17. Does not qualify for prednisolone.  Continue symptomatic treatment.  Alcohol cessation advised

## 2022-09-10 NOTE — PROGRESS NOTES
Received report from CONCEPCION Tafoya. Pt is A&O x4. Pt on RA, no signs of acute distress. Pt complains of 8/10 pain to abdomen. Medicated with Dialudid per MAR. POC discussed with patient. Safety precautions in place, bed in lowest position, and call light and personal belongings within reach. Hourly rounding in place.

## 2022-09-10 NOTE — PROGRESS NOTES
Patient alert and oriented x4 lying on right side in bed. Patient education regarding medications, labs and scans. Patient attempted to drink Boost and eat ice chips by felt nauseated and spit up the liquids. Nausea medications given. Patient refuses any solids.

## 2022-09-10 NOTE — H&P
Hospital Medicine History & Physical Note    Date of Service  9/9/2022    Primary Care Physician  Pcp Pt States None      Code Status  Full Code    Chief Complaint  Chief Complaint   Patient presents with    Abdominal Pain     BIB [facility] for RUQ pain, N/V, hypoglycemia. Per EMS, Pt was found to have a glucose level of 50 mg/dL, given 100 mL of D10 and glucose cornelio to 98 mg/dL on last check. Pt states she hasn't had any water or food for weeks. Complaining of jaundice. 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       History of Presenting Illness  Hien Huynh is a 47 y.o. female with past medical history of chronic liver disease, who presented 9/9/2022 with persistent right upper quadrant abdominal pain up to 10 out of 10, nausea and vomiting and inability to tolerate oral intake, weight loss 20 pounds in the last 2 weeks, chills, fatigue, malaise.  Patient is somewhat poor historian.  She states that she is experiencing liver failure secondary to Tylenol poisoning that happened many years ago when she was younger.  According to her current symptoms of right upper quadrant abdominal pain has been going on for the last 6 months.  [place] and does not have established PCP or gastroenterologist.  She was self-medicating with a cocktail of vodka and orange juice 3 times a week, but states that she does not do heavy drinking.  About 5 days ago she noticed jaundice of the skin.  It was noted that patient was seen on 8/18 in this emergency department and was prescribed cefdinir for pyelonephritis that she did not finish.    Patient was brought by EMS with hypoglycemia with blood sugar 50, given 100 cc of D10.  She does not have history of diabetes.  Denies taking any medications.  Abdominal ultrasound: Hepatomegaly, no gallstone, no hepatic mass, no CBD dilation  Blood work significant for elevated ammonia 64, lactic acid 2.4, bicarb 11, AST,  ALT, bilirubin 5.9,  UDS positive, cannabinoids and opiates.    I discussed the plan of care with patient.    Review of Systems  Review of Systems   Constitutional:  Positive for chills, malaise/fatigue and weight loss. Negative for fever.   HENT:  Negative for ear pain, hearing loss and tinnitus.    Eyes:  Negative for blurred vision, double vision and photophobia.   Respiratory:  Negative for cough, hemoptysis and sputum production.    Cardiovascular:  Negative for chest pain, palpitations and orthopnea.   Gastrointestinal:  Positive for abdominal pain, diarrhea, nausea and vomiting. Negative for heartburn.   Genitourinary:  Negative for dysuria, flank pain, frequency and hematuria.   Musculoskeletal:  Negative for back pain, joint pain and neck pain.   Skin:  Negative for itching and rash.   Neurological:  Positive for weakness. Negative for tremors, speech change, focal weakness and headaches.   Endo/Heme/Allergies:  Negative for environmental allergies and polydipsia. Does not bruise/bleed easily.   Psychiatric/Behavioral:  Negative for hallucinations and substance abuse. The patient is not nervous/anxious.      Past Medical History   has a past medical history of Cirrhosis (HCC), Liver disease, and Renal disorder.    Surgical History   has no past surgical history on file.     Family History     Family history reviewed with patient. There is no family history that is pertinent to the chief complaint.     Social History   reports that she has been smoking cigarettes. She has never used smokeless tobacco. She reports current alcohol use of about 7.2 oz per week. She reports current drug use.    Allergies  Allergies   Allergen Reactions    Penicillins Hives       Medications  Prior to Admission Medications   Prescriptions Last Dose Informant Patient Reported? Taking?   cefdinir (OMNICEF) 300 MG Cap UNKN at Stopped Patient No No   Sig: Take 1 Capsule by mouth 2 times a day.      Facility-Administered Medications:  None       Physical Exam  Pulse:  [105-108] 108  Resp:  [22-24] 24  BP: (158-189)/() 163/92  SpO2:  [93 %-95 %] 93 %  Blood Pressure: (!) 163/92       Pulse: (!) 108   Respiration: (!) 24   Pulse Oximetry: 93 %       Physical Exam  Vitals and nursing note reviewed.   Constitutional:       General: She is not in acute distress.     Appearance: Normal appearance. She is ill-appearing.   HENT:      Head: Normocephalic and atraumatic.      Nose: Nose normal.      Mouth/Throat:      Mouth: Mucous membranes are moist.   Eyes:      Extraocular Movements: Extraocular movements intact.      Pupils: Pupils are equal, round, and reactive to light.   Cardiovascular:      Rate and Rhythm: Normal rate and regular rhythm.   Pulmonary:      Effort: Pulmonary effort is normal.      Breath sounds: Normal breath sounds.   Abdominal:      General: Abdomen is flat. There is no distension.      Tenderness: There is abdominal tenderness in the right upper quadrant. There is no guarding or rebound.   Musculoskeletal:         General: No swelling or deformity. Normal range of motion.      Cervical back: Normal range of motion and neck supple.   Skin:     General: Skin is warm and dry.      Coloration: Skin is jaundiced.   Neurological:      General: No focal deficit present.      Mental Status: She is alert and oriented to person, place, and time.   Psychiatric:         Mood and Affect: Mood normal.         Behavior: Behavior normal.       Laboratory:  Recent Labs     09/09/22  1710   WBC 8.2   RBC 2.89*   HEMOGLOBIN 11.2*   HEMATOCRIT 33.2*   .9*   MCH 38.8*   MCHC 33.7   RDW 66.8*   PLATELETCT 194   MPV 11.5     Recent Labs     09/09/22  1710   SODIUM 138   POTASSIUM 3.8   CHLORIDE 94*   CO2 11*   GLUCOSE 71   BUN 6*   CREATININE 0.47*   CALCIUM 8.5     Recent Labs     09/09/22  1710   ALTSGPT 93*   ASTSGOT 314*   ALKPHOSPHAT 123*   TBILIRUBIN 5.9*   LIPASE 64   GLUCOSE 71         No results for input(s): NTPROBNP in the last  72 hours.      No results for input(s): TROPONINT in the last 72 hours.    Imaging:  US-RUQ   Final Result      1.  No acute sonographic abnormality. No gallstones.   2.  Hepatomegaly with heterogeneous hepatic echogenicity, which may be due to regional steatosis and/or hepatocellular disease.   3.  No hepatic mass lesions.      [id number]    (Results Pending)   NM-BILIARY (HIDA) SCAN WITH CCK    (Results Pending)           Assessment/Plan:  Justification for Admission Status  I anticipate this patient will require at least two midnights for appropriate medical management, necessitating inpatient admission because abdominal pain with severe acidosis, liver failure    Patient will need a  bed on EMERGENCY service .  The need is secondary to liver failure.    * Abdominal pain- (present on admission)  Assessment & Plan  Complaining of worsening of chronic right upper quadrant pain, nausea, vomiting inability to tolerate oral intake  Lipase is not elevated.  Gastritis, duodenitis, biliary obstruction on differential  Right upper quadrant ultrasound showed hepatic steatosis, hepatomegaly, no CBD dilation, no cholecystitis, no gallbladder stones  Hyperbilirubinemia noted  History of cirrhosis, likely alcoholic  According to elevated AST/ALT, likely superimposed alcoholic hepatitis  Cannot rule out biliary obstruction  Plan: Symptomatic treatment  Will obtain HIDA (if not contraindicated given her elevated bilirubin), MRI of the abdomen      Pyelonephritis  Assessment & Plan  Patient was prescribed Omnicef for 10 days on 8/18 that she did not complete  Order IV ceftriaxone and repeat urine culture    Hypoglycemia  Assessment & Plan  Likely secondary to malnutrition and liver disease  Encourage oral intake, supplements    Nausea and vomiting  Assessment & Plan  Work-up as above, MRCP, HIDA scan  Zofran as needed, antiacids  Clear liquid diet as tolerated    High anion gap metabolic acidosis  Assessment & Plan  Mostly  starvation ketoacidosis, lactic acidosis  Plan: IV hydration, thiamine  Replace electrolytes      Hypertension  Assessment & Plan  Uncontrolled  We will start amlodipine    Hepatic encephalopathy (HCC)  Assessment & Plan  Ammonia 64  Will start lactulose    Alcoholic hepatitis  Assessment & Plan  Hepatitis discrimination score 17  Does not qualify for prednisolone  Symptomatic treatment      VTE prophylaxis: SCDs/TEDs

## 2022-09-10 NOTE — PROGRESS NOTES
4 Eyes Skin Assessment Completed by CONCEPCION Hall and CONCEPCION Johnson.    Head WDL  Ears WDL  Nose WDL  Mouth WDL  Neck WDL  Breast/Chest WDL  Shoulder Blades Scabbing  Spine Scabbing  (R) Arm/Elbow/Hand WDL  (L) Arm/Elbow/Hand WDL  Abdomen WDL  Groin WDL  Scrotum/Coccyx/Buttocks WDL  (R) Leg scabbing  (L) Leg scabbing  (R) Heel/Foot/Toe WDL  (L) Heel/Foot/Toe WDL          Devices In Places PIVx2      Interventions In Place Pillows and Pressure Redistribution Mattress    Possible Skin Injury No

## 2022-09-10 NOTE — ASSESSMENT & PLAN NOTE
Still has episodes of confusion, overall slightly improving. Ammonia was 61 on 9/12/2022.  Continue lactulose, titrate as goal of 2-3 loose bowel movements per day  Patient does not complaint with lactulose. Counseled patient and she voiced understanding.

## 2022-09-10 NOTE — ASSESSMENT & PLAN NOTE
Likely sec to pancreatitis given significant alcohol abuse history.   MRI abdomen from 9/11/2022 showed findings consistent with pancreatitis, fluid/blood levels of the gallbladder representing sludge.  Abd US: hepatomegaly with heterogeneous hepatic echogenicity. No hepatic mass. No gallstone    Advance diet as tolerated  Symptomatic control

## 2022-09-10 NOTE — DIETARY
Nutrition services: Day 1 of admit.  Hien Huynh is a 47 y.o. female admitted with abdominal pain up to 10 out of 10, nausea and vomiting and inability to tolerate oral intake, weight loss 20 pounds in the last 2 weeks, chills, fatigue, malaise, pyelonephritis  History includes chronic liver disease    Patient with weight loss and poor PO intake noted on admit screen.  Patient reported she has been eating almost nothing for the past 2-3 weeks.  She stated she is having trouble even keeping liquids down. She reported weight loss of 20# in the past 3 weeks.    Assessment:  Weight: 71.2 kg (156 lb 15.5 oz)  Body mass index is 26.94 kg/m².  Diet/Intake: NPO    Evaluation:   IVF of D5 per MAR  Pertinent Medications include Lactulose, Thiamine, Pepcid, Zofran, Compazine, Phenergan, Senna-docusate  Weight loss per patient (see above)  Patient is at risk for refeeding syndrome.    Malnutrition Risk: Meets ASPEN criteria for severe acute illness related malnutrition as evidenced by 12-13% weight loss in less than a month and intakes of <50% of estimated needs for >5 days.    Recommendations/Interventions/Plan:    Start PO diet when clinically feasible  Follow electrolytes - thiamine already in place. Lab orders placed.      RD following

## 2022-09-10 NOTE — CARE PLAN
The patient is Watcher    Shift Goals  Clinical Goals: Monitor blood sugars    Progress made toward(s) clinical / shift goals:  Pt BG at 66. Hypoglycemic protocols initialed. Now 157.     Patient is not progressing towards the following goals:

## 2022-09-10 NOTE — ED PROVIDER NOTES
ED Provider Note    CHIEF COMPLAINT  Chief Complaint   Patient presents with    Abdominal Pain     BIB [facility] for RUQ pain, N/V, hypoglycemia. Per EMS, Pt was found to have a glucose level of 50 mg/dL, given 100 mL of D10 and glucose cornelio to 98 mg/dL on last check. Pt states she hasn't had any water or food for weeks. Complaining of jaundice. 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       STEVE Huynh is a 47 y.o. female who presents with right upper quadrant abdominal pain.  She has had pain for few weeks but has been quite constant and severe for the last week.  She has nausea and vomits when she eats or drinks.  No fever although she is felt chilled had headaches and body aches.  No cough.  She was diagnosed with pyelonephritis recently and vomited the antibiotic and did not complete a full course.  No persistent dysuria urgency or frequency.  She has itching and excoriations.  She has a history of liver injury from Tylenol years ago.  She does drink 3 drinks 3 times a week.  She just moved here from [place] and does not have a local gastroenterologist.  She still has her gallbladder.    REVIEW OF SYSTEMS  Pertinent positives include: Right upper quadrant abdominal pain, nausea, vomiting.  Pertinent negatives include: Leg swelling, calf pain.  10+ systems reviewed and negative.      PAST MEDICAL HISTORY  Past Medical History:   Diagnosis Date    Cirrhosis (HCC)     Liver disease     Renal disorder          SOCIAL HISTORY  Social History     Tobacco Use    Smoking status: Some Days     Types: Cigarettes    Smokeless tobacco: Never   Vaping Use    Vaping Use: Never used   Substance Use Topics    Alcohol use: Yes     Alcohol/week: 7.2 oz     Types: 12 Glasses of wine per week     Comment: daily etoh    Drug use: Yes     Comment: occ marijuana     Social History     Substance and Sexual Activity   Drug Use Yes    Comment: occ marijuana       SURGICAL  HISTORY  No prior cholecystectomy    CURRENT MEDICATIONS  Current Facility-Administered Medications   Medication Dose Route Frequency Provider Last Rate Last Admin    NS infusion 1,000 mL  1,000 mL Intravenous Once Candelario Rader M.D.        morphine 4 MG/ML injection 4 mg  4 mg Intravenous Once Candelario Rader M.D.        ondansetron (ZOFRAN) syringe/vial injection 4 mg  4 mg Intravenous Once Candelario Rader M.D.         Current Outpatient Medications   Medication Sig Dispense Refill    cefdinir (OMNICEF) 300 MG Cap Take 1 Capsule by mouth 2 times a day. 20 Capsule 0   Cannot tolerate her lactulose, out of spironolactone since June    ALLERGIES  Allergies   Allergen Reactions    Penicillins Hives       PHYSICAL EXAM  VITAL SIGNS: BP (!) 189/105   Pulse (!) 105   SpO2 95%   Reviewed and tachycardic, hypertensive  Constitutional: Well developed, Well nourished, well-appearing, slightly restless.  HENT: Normocephalic, atraumatic, bilateral external ears normal, Wearing mask.   Eyes: PERRLA, conjunctiva pink, mild scleral icterus.   Cardiovascular: Tachycardic regular S1-S2 without murmur, rub, gallop.  No dependent edema or calf tenderness.  Respiratory: No rales, rhonchi, wheeze or cough.  Gastrointestinal: Soft, moderate right upper quadrant tenderness without rebound, nondistended, no organomegaly.  Skin: No erythema, no rash.  No definite jaundice  Genitourinary:  No costovertebral angle tenderness.   Neurologic: Alert & oriented x 3, cranial nerves 2-12 intact by passive exam.  No focal deficit noted.  Psychiatric: Affect normal, Judgment normal, Mood normal.     DIFFERENTIAL DIAGNOSIS:  Acute hepatic failure, ascites, cholecystitis, dehydration.      RADIOLOGY/PROCEDURES  US-RUQ   Final Result      1.  No acute sonographic abnormality. No gallstones.   2.  Hepatomegaly with heterogeneous hepatic echogenicity, which may be due to regional steatosis and/or hepatocellular disease.   3.  No hepatic mass lesions.        LABORATORY:  Results for orders placed or performed during the hospital encounter of 09/09/22   CBC WITH DIFFERENTIAL   Result Value Ref Range    WBC 8.2 4.8 - 10.8 K/uL    RBC 2.89 (L) 4.20 - 5.40 M/uL    Hemoglobin 11.2 (L) 12.0 - 16.0 g/dL    Hematocrit 33.2 (L) 37.0 - 47.0 %    .9 (H) 81.4 - 97.8 fL    MCH 38.8 (H) 27.0 - 33.0 pg    MCHC 33.7 33.6 - 35.0 g/dL    RDW 66.8 (H) 35.9 - 50.0 fL    Platelet Count 194 164 - 446 K/uL    MPV 11.5 9.0 - 12.9 fL    Neutrophils-Polys 75.60 (H) 44.00 - 72.00 %    Lymphocytes 15.50 (L) 22.00 - 41.00 %    Monocytes 7.20 0.00 - 13.40 %    Eosinophils 0.20 0.00 - 6.90 %    Basophils 1.10 0.00 - 1.80 %    Immature Granulocytes 0.40 0.00 - 0.90 %    Nucleated RBC 0.00 /100 WBC    Neutrophils (Absolute) 6.18 2.00 - 7.15 K/uL    Lymphs (Absolute) 1.27 1.00 - 4.80 K/uL    Monos (Absolute) 0.59 0.00 - 0.85 K/uL    Eos (Absolute) 0.02 0.00 - 0.51 K/uL    Baso (Absolute) 0.09 0.00 - 0.12 K/uL    Immature Granulocytes (abs) 0.03 0.00 - 0.11 K/uL    NRBC (Absolute) 0.00 K/uL    Anisocytosis 2+ (A)     Macrocytosis 2+ (A)    Comp Metabolic Panel   Result Value Ref Range    Sodium 138 135 - 145 mmol/L    Potassium 3.8 3.6 - 5.5 mmol/L    Chloride 94 (L) 96 - 112 mmol/L    Co2 11 (L) 20 - 33 mmol/L    Anion Gap 33.0 (H) 7.0 - 16.0    Glucose 71 65 - 99 mg/dL    Bun 6 (L) 8 - 22 mg/dL    Creatinine 0.47 (L) 0.50 - 1.40 mg/dL    Calcium 8.5 8.5 - 10.5 mg/dL    AST(SGOT) 314 (H) 12 - 45 U/L    ALT(SGPT) 93 (H) 2 - 50 U/L    Alkaline Phosphatase 123 (H) 30 - 99 U/L    Total Bilirubin 5.9 (H) 0.1 - 1.5 mg/dL    Albumin 4.2 3.2 - 4.9 g/dL    Total Protein 7.6 6.0 - 8.2 g/dL    Globulin 3.4 1.9 - 3.5 g/dL    A-G Ratio 1.2 g/dL   LIPASE   Result Value Ref Range    Lipase 64 11 - 82 U/L   Beta hydroxybutyrate lactic acid levels pending    INTERVENTIONS:  NS infusion 1,000 mL (1,000 mL Intravenous New Bag 9/9/22 1804)   morphine 4 MG/ML injection 4 mg (4 mg Intravenous Given 9/9/22 1804)    ondansetron (ZOFRAN) syringe/vial injection 4 mg (4 mg Intravenous Given 9/9/22 1804)   NS infusion 1,000 mL (1,000 mL Intravenous New Bag 9/9/22 1942)       COURSE & MEDICAL DECISION MAKING  This patient with a history of cirrhosis due to Tylenol and alcohol use has been noncompliant with meds since running out of them presents for crescendoing abdominal pain and uncontrolled vomiting.  She has a quite severe anion gap acidosis, increased levels of liver enzymes and increased T bili from baseline.  There is no evidence of cholecystitis or choledocholithiasis.  There is no pancreatitis.  She does not have a history of diabetes.  She denies any exotic alcohol or other medication use that might explain an anion gap acidosis    Discussed with Dr. Velazquez hospitalist who will admit the patient    PLAN:  IV fluids, antiemetics, further imaging, bowel rest, resumption of cirrhosis medications, GI consultation as needed    CONDITION: Guarded.    FINAL IMPRESSION  1. Acute liver failure without hepatic coma    2. Dehydration          Electronically signed by: Candelario Rader M.D., 9/9/2022 5:46 PM

## 2022-09-10 NOTE — ED NOTES
Pt resting in room, easy, equal chest rise and fall. Pt remains calm and cooperative. Report given to CONCEPCION Tafoya

## 2022-09-10 NOTE — ASSESSMENT & PLAN NOTE
Patient was prescribed a 10-day course of Omnicef on 8/18/2022, which patient did not complete.  Ceftriaxone was ordered on admission.

## 2022-09-10 NOTE — ED TRIAGE NOTES
Chief Complaint   Patient presents with    Abdominal Pain     BIB [facility] for RUQ pain, N/V, hypoglycemia. Per EMS, Pt was found to have a glucose level of 50 mg/dL, given 100 mL of D10 and glucose cornelio to 98 mg/dL on last check. Pt states she hasn't had any water or food for weeks. Complaining of jaundice. 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

## 2022-09-10 NOTE — PROGRESS NOTES
Hospital Medicine Daily Progress Note    Date of Service  9/10/2022    Chief Complaint  Hien Huynh is a 47 y.o. female admitted 9/9/2022 with right upper quadrant pain, nausea, vomiting, with inability to tolerate oral intake.  She reported 20 pound weight loss in the last few weeks.  She has a history of chronic liver disease.  She reported self-medicating with vodka and orange juice 3 times a day.  She was recently seen in the ER (8/18/2022) and prescribed cefdinir for pyelonephritis, which she did not finish.  Urine culture from 8/18/2022 was positive for Streptococcus agalactiae and E. coli.    Hospital Course  Abdominal ultrasound showed hepatomegaly, no gallstones, hepatic mass or CBD dilation.  Lactic acid was 2.4, ammonia was 64, AST was 313, ALT was 93, total bilirubin was 5.9. UDS was positive for cannabinoids and opiates.      Ceftriaxone was started on admission.    Interval Problem Update  Afebrile, hemodynamically stable.  Potassium is 3.3, sodium is 134.  MRI abdomen and HIDA scan were ordered.  Patient was unable to have a HIDA scan done today due to inability to tolerate boost.    I have discussed this patient's plan of care and discharge plan at IDT rounds today with Case Management, Nursing, Nursing leadership, and other members of the IDT team.    Consultants/Specialty  None    Code Status  Full Code    Disposition  Patient is not medically cleared for discharge.   Anticipate discharge to to home with close outpatient follow-up.  I have placed the appropriate orders for post-discharge needs.    Review of Systems  Review of Systems   Constitutional:  Positive for malaise/fatigue.   HENT: Negative.     Eyes: Negative.    Respiratory: Negative.     Cardiovascular: Negative.    Gastrointestinal:  Positive for abdominal pain and nausea.        RUQ pain   Genitourinary: Negative.    Musculoskeletal: Negative.    Skin: Negative.    Neurological: Negative.    Endo/Heme/Allergies: Negative.     Psychiatric/Behavioral: Negative.        Physical Exam  Temp:  [36.4 °C (97.6 °F)-37.1 °C (98.8 °F)] 36.9 °C (98.5 °F)  Pulse:  [] 97  Resp:  [18-24] 18  BP: (135-189)/() 135/77  SpO2:  [93 %-95 %] 94 %    Physical Exam  Constitutional:       General: She is in acute distress.      Appearance: She is ill-appearing.   HENT:      Head: Normocephalic.      Nose: Nose normal.      Mouth/Throat:      Mouth: Mucous membranes are moist.   Eyes:      Pupils: Pupils are equal, round, and reactive to light.   Cardiovascular:      Rate and Rhythm: Normal rate.   Pulmonary:      Effort: Pulmonary effort is normal.   Abdominal:      Tenderness: There is abdominal tenderness.   Musculoskeletal:      Cervical back: Normal range of motion.      Right lower leg: No edema.      Left lower leg: No edema.   Skin:     General: Skin is warm.   Neurological:      General: No focal deficit present.      Mental Status: She is alert.   Psychiatric:         Mood and Affect: Mood normal.       Fluids  No intake or output data in the 24 hours ending 09/10/22 1356    Laboratory  Recent Labs     09/09/22  1710 09/09/22  2057 09/10/22  0337   WBC 8.2  --  7.5   RBC 2.89*  --  2.95*   HEMOGLOBIN 11.2*  --  11.5*   HEMATOCRIT 33.2*  --  33.5*   .9*  --  113.6*   MCH 38.8*  --  39.0*   MCHC 33.7  --  34.3   RDW 66.8*  --  63.7*   PLATELETCT 194 223 174   MPV 11.5  --  11.1     Recent Labs     09/09/22  1710 09/10/22  0337   SODIUM 138 134*   POTASSIUM 3.8 3.3*   CHLORIDE 94* 98   CO2 11* 17*   GLUCOSE 71 83   BUN 6* 5*   CREATININE 0.47* 0.61   CALCIUM 8.5 7.8*     Recent Labs     09/09/22 2051   INR 1.24*               Imaging  US-RUQ   Final Result      1.  No acute sonographic abnormality. No gallstones.   2.  Hepatomegaly with heterogeneous hepatic echogenicity, which may be due to regional steatosis and/or hepatocellular disease.   3.  No hepatic mass lesions.      [id number]/O    (Results Pending)         Assessment/Plan  * Abdominal pain- (present on admission)  Assessment & Plan  Unclear etiology.  Suspect alcoholic hepatitis.  MRI abdomen and HIDA scan were ordered.  HIDA scan was unable to be done as patient was unable to tolerate boost (fatty meal) prior to imaging    Pyelonephritis  Assessment & Plan  Patient was prescribed a 10-day course of Omnicef on 8/18/2022, which patient did not complete.  Ceftriaxone was ordered on admission.  Urine culture pending    Hypoglycemia  Assessment & Plan  Likely secondary to malnutrition and liver disease  Encourage oral intake, supplements    Nausea and vomiting  Assessment & Plan  Unable to tolerate boastful or discomfort. Tolerating clear liquid diet.  Monitor    High anion gap metabolic acidosis  Assessment & Plan  Likely starvation ketoacidosis with lactic acidosis.  Continue hydration.  Replete electrolytes.    Hypertension  Assessment & Plan  Amlodipine was started.  Continue for now, and adjust as needed    Hepatic encephalopathy (HCC)  Assessment & Plan  Ammonia was 64 on 9/9/2022.  Continue lactulose    Alcoholic hepatitis  Assessment & Plan  Hepatitis discrimination score 17. Does not qualify for prednisolone.  Continue symptomatic treatment.       VTE prophylaxis: SCDs/TEDs

## 2022-09-10 NOTE — PROGRESS NOTES
Hypoglycemia Intervention    Hypoglycemia protocol intervention:  Blood glucose 66 at 0040.  Intervention: 250mg D10 IV  Repeat blood glucose 157 at about 0155.  Intervention: Patient NPO, recheck blood glucose in 1 hour   Additional interventions needed: N/A   LUIS Bashir, notified of the above at 0212.

## 2022-09-10 NOTE — PROGRESS NOTES
Per nurse, patient has not eaten in over 3 days.  Pt attempted to drink Boost but she was unable to tolerate.  Unable to perform HIDA scan until patient has consumed a fatty meal.

## 2022-09-11 LAB
ALBUMIN SERPL BCP-MCNC: 4.1 G/DL (ref 3.2–4.9)
ALBUMIN/GLOB SERPL: 1.4 G/DL
ALP SERPL-CCNC: 116 U/L (ref 30–99)
ALT SERPL-CCNC: 76 U/L (ref 2–50)
AMMONIA PLAS-SCNC: 61 UMOL/L (ref 11–45)
ANION GAP SERPL CALC-SCNC: 14 MMOL/L (ref 7–16)
AST SERPL-CCNC: 270 U/L (ref 12–45)
BASOPHILS # BLD AUTO: 0.5 % (ref 0–1.8)
BASOPHILS # BLD: 0.03 K/UL (ref 0–0.12)
BILIRUB SERPL-MCNC: 5.1 MG/DL (ref 0.1–1.5)
BUN SERPL-MCNC: 4 MG/DL (ref 8–22)
CALCIUM SERPL-MCNC: 8.9 MG/DL (ref 8.5–10.5)
CHLORIDE SERPL-SCNC: 99 MMOL/L (ref 96–112)
CO2 SERPL-SCNC: 20 MMOL/L (ref 20–33)
CREAT SERPL-MCNC: 0.52 MG/DL (ref 0.5–1.4)
EOSINOPHIL # BLD AUTO: 0.24 K/UL (ref 0–0.51)
EOSINOPHIL NFR BLD: 4.2 % (ref 0–6.9)
ERYTHROCYTE [DISTWIDTH] IN BLOOD BY AUTOMATED COUNT: 59 FL (ref 35.9–50)
GFR SERPLBLD CREATININE-BSD FMLA CKD-EPI: 116 ML/MIN/1.73 M 2
GLOBULIN SER CALC-MCNC: 3 G/DL (ref 1.9–3.5)
GLUCOSE SERPL-MCNC: 93 MG/DL (ref 65–99)
HCT VFR BLD AUTO: 32.6 % (ref 37–47)
HGB BLD-MCNC: 11.3 G/DL (ref 12–16)
IMM GRANULOCYTES # BLD AUTO: 0.03 K/UL (ref 0–0.11)
IMM GRANULOCYTES NFR BLD AUTO: 0.5 % (ref 0–0.9)
LYMPHOCYTES # BLD AUTO: 1.18 K/UL (ref 1–4.8)
LYMPHOCYTES NFR BLD: 20.5 % (ref 22–41)
MAGNESIUM SERPL-MCNC: 1.5 MG/DL (ref 1.5–2.5)
MCH RBC QN AUTO: 38.4 PG (ref 27–33)
MCHC RBC AUTO-ENTMCNC: 34.7 G/DL (ref 33.6–35)
MCV RBC AUTO: 110.9 FL (ref 81.4–97.8)
MONOCYTES # BLD AUTO: 0.33 K/UL (ref 0–0.85)
MONOCYTES NFR BLD AUTO: 5.7 % (ref 0–13.4)
NEUTROPHILS # BLD AUTO: 3.94 K/UL (ref 2–7.15)
NEUTROPHILS NFR BLD: 68.6 % (ref 44–72)
NRBC # BLD AUTO: 0 K/UL
NRBC BLD-RTO: 0 /100 WBC
PHOSPHATE SERPL-MCNC: 0.4 MG/DL (ref 2.5–4.5)
PLATELET # BLD AUTO: 141 K/UL (ref 164–446)
PMV BLD AUTO: 11.1 FL (ref 9–12.9)
POTASSIUM SERPL-SCNC: 3.5 MMOL/L (ref 3.6–5.5)
PROT SERPL-MCNC: 7.1 G/DL (ref 6–8.2)
RBC # BLD AUTO: 2.94 M/UL (ref 4.2–5.4)
SODIUM SERPL-SCNC: 133 MMOL/L (ref 135–145)
WBC # BLD AUTO: 5.8 K/UL (ref 4.8–10.8)

## 2022-09-11 PROCEDURE — 700102 HCHG RX REV CODE 250 W/ 637 OVERRIDE(OP): Performed by: INTERNAL MEDICINE

## 2022-09-11 PROCEDURE — 700111 HCHG RX REV CODE 636 W/ 250 OVERRIDE (IP)

## 2022-09-11 PROCEDURE — 83735 ASSAY OF MAGNESIUM: CPT

## 2022-09-11 PROCEDURE — 700111 HCHG RX REV CODE 636 W/ 250 OVERRIDE (IP): Performed by: INTERNAL MEDICINE

## 2022-09-11 PROCEDURE — 84100 ASSAY OF PHOSPHORUS: CPT

## 2022-09-11 PROCEDURE — 99233 SBSQ HOSP IP/OBS HIGH 50: CPT | Performed by: INTERNAL MEDICINE

## 2022-09-11 PROCEDURE — A9270 NON-COVERED ITEM OR SERVICE: HCPCS | Performed by: INTERNAL MEDICINE

## 2022-09-11 PROCEDURE — 36415 COLL VENOUS BLD VENIPUNCTURE: CPT

## 2022-09-11 PROCEDURE — 80053 COMPREHEN METABOLIC PANEL: CPT

## 2022-09-11 PROCEDURE — 770001 HCHG ROOM/CARE - MED/SURG/GYN PRIV*

## 2022-09-11 PROCEDURE — 700105 HCHG RX REV CODE 258: Performed by: INTERNAL MEDICINE

## 2022-09-11 PROCEDURE — 700105 HCHG RX REV CODE 258

## 2022-09-11 PROCEDURE — 85025 COMPLETE CBC W/AUTO DIFF WBC: CPT

## 2022-09-11 PROCEDURE — 82140 ASSAY OF AMMONIA: CPT

## 2022-09-11 PROCEDURE — 700101 HCHG RX REV CODE 250: Performed by: INTERNAL MEDICINE

## 2022-09-11 RX ORDER — MORPHINE SULFATE 4 MG/ML
1 INJECTION INTRAVENOUS EVERY 4 HOURS PRN
Status: DISCONTINUED | OUTPATIENT
Start: 2022-09-11 | End: 2022-09-14

## 2022-09-11 RX ADMIN — MORPHINE SULFATE 4 MG: 4 INJECTION INTRAVENOUS at 09:38

## 2022-09-11 RX ADMIN — FAMOTIDINE 20 MG: 10 INJECTION, SOLUTION INTRAVENOUS at 18:13

## 2022-09-11 RX ADMIN — LACTULOSE 30 ML: 20 SOLUTION ORAL at 13:00

## 2022-09-11 RX ADMIN — CEFTRIAXONE SODIUM 1 G: 10 INJECTION, POWDER, FOR SOLUTION INTRAVENOUS at 06:10

## 2022-09-11 RX ADMIN — THIAMINE HYDROCHLORIDE 100 MG: 100 INJECTION, SOLUTION INTRAMUSCULAR; INTRAVENOUS at 06:11

## 2022-09-11 RX ADMIN — ONDANSETRON 4 MG: 2 INJECTION INTRAMUSCULAR; INTRAVENOUS at 16:37

## 2022-09-11 RX ADMIN — MORPHINE SULFATE 1 MG: 4 INJECTION, SOLUTION INTRAMUSCULAR; INTRAVENOUS at 16:37

## 2022-09-11 RX ADMIN — SUCRALFATE 1 G: 1 TABLET ORAL at 13:00

## 2022-09-11 RX ADMIN — MORPHINE SULFATE 1 MG: 4 INJECTION, SOLUTION INTRAMUSCULAR; INTRAVENOUS at 20:39

## 2022-09-11 RX ADMIN — MORPHINE SULFATE 4 MG: 4 INJECTION INTRAVENOUS at 04:11

## 2022-09-11 RX ADMIN — SUCRALFATE 1 G: 1 TABLET ORAL at 18:12

## 2022-09-11 RX ADMIN — FAMOTIDINE 20 MG: 10 INJECTION, SOLUTION INTRAVENOUS at 06:11

## 2022-09-11 RX ADMIN — ONDANSETRON 4 MG: 2 INJECTION INTRAMUSCULAR; INTRAVENOUS at 02:26

## 2022-09-11 RX ADMIN — DEXTROSE MONOHYDRATE: 50 INJECTION, SOLUTION INTRAVENOUS at 09:38

## 2022-09-11 RX ADMIN — ONDANSETRON 4 MG: 2 INJECTION INTRAMUSCULAR; INTRAVENOUS at 09:39

## 2022-09-11 ASSESSMENT — ENCOUNTER SYMPTOMS
NAUSEA: 1
ABDOMINAL PAIN: 1
CARDIOVASCULAR NEGATIVE: 1
MUSCULOSKELETAL NEGATIVE: 1
RESPIRATORY NEGATIVE: 1
PSYCHIATRIC NEGATIVE: 1
NEUROLOGICAL NEGATIVE: 1
EYES NEGATIVE: 1
ROS GI COMMENTS: RUQ PAIN

## 2022-09-11 ASSESSMENT — PAIN DESCRIPTION - PAIN TYPE
TYPE: ACUTE PAIN;CHRONIC PAIN
TYPE: ACUTE PAIN

## 2022-09-11 NOTE — CARE PLAN
Problem: Knowledge Deficit - Standard  Goal: Patient and family/care givers will demonstrate understanding of plan of care, disease process/condition, diagnostic tests and medications  Outcome: Progressing     Problem: Pain - Standard  Goal: Alleviation of pain or a reduction in pain to the patient’s comfort goal  Outcome: Progressing     Problem: Skin Integrity  Goal: Skin integrity is maintained or improved  Outcome: Progressing     The patient is Stable - Low risk of patient condition declining or worsening    Shift Goals  Clinical Goals: HIDA Scan  Patient Goals: MRI Results  Family Goals: None present    Progress made toward(s) clinical / shift goals:  HIDA scan pending, MRI resulted and discussed with patient.    Patient is not progressing towards the following goals: HIDA pending.

## 2022-09-11 NOTE — PROGRESS NOTES
Hospital Medicine Daily Progress Note    Date of Service  9/11/2022    Chief Complaint  Hien Huynh is a 47 y.o. female admitted 9/9/2022 with right upper quadrant pain, nausea, vomiting, with inability to tolerate oral intake.  She reported 20 pound weight loss in the last few weeks.  She has a history of chronic liver disease.  She reported self-medicating with vodka and orange juice 3 times a day.  She was recently seen in the ER (8/18/2022) and prescribed cefdinir for pyelonephritis, which she did not finish.  Urine culture from 8/18/2022 was positive for Streptococcus agalactiae and E. coli.    Hospital Course  Abdominal ultrasound showed hepatomegaly, no gallstones, hepatic mass or CBD dilation.  Lactic acid was 2.4, ammonia was 64, AST was 313, ALT was 93, total bilirubin was 5.9. UDS was positive for cannabinoids and opiates.      Ceftriaxone was started on admission.  HIDA scan was ordered 9/10/2022, unable to be done as patient was unable to tolerate boost.    Interval Problem Update  Afebrile.  Potassium is 3.5, sodium is 133.  MRI abdomen from today shows consistent with pancreatitis, fluid/blood levels of the gallbladder representing sludge.  Abdominal pain is improving.  Still having nausea and requiring narcotic pain medications.  N.p.o.    I have discussed this patient's plan of care and discharge plan at IDT rounds today with Case Management, Nursing, Nursing leadership, and other members of the IDT team.    Consultants/Specialty  None    Code Status  Full Code    Disposition  Patient is not medically cleared for discharge.   Anticipate discharge to to home with close outpatient follow-up.  I have placed the appropriate orders for post-discharge needs.    Review of Systems  Review of Systems   Constitutional:  Positive for malaise/fatigue.   HENT: Negative.     Eyes: Negative.    Respiratory: Negative.     Cardiovascular: Negative.    Gastrointestinal:  Positive for abdominal pain and nausea.         RUQ pain   Genitourinary: Negative.    Musculoskeletal: Negative.    Skin: Negative.    Neurological: Negative.    Endo/Heme/Allergies: Negative.    Psychiatric/Behavioral: Negative.        Physical Exam  Temp:  [36.3 °C (97.3 °F)-36.6 °C (97.8 °F)] 36.4 °C (97.5 °F)  Pulse:  [] 103  Resp:  [17-20] 17  BP: (138-159)/() 152/100  SpO2:  [93 %-98 %] 93 %    Physical Exam  Constitutional:       General: She is in acute distress.      Appearance: She is ill-appearing.   HENT:      Head: Normocephalic.      Nose: Nose normal.      Mouth/Throat:      Mouth: Mucous membranes are moist.   Eyes:      Pupils: Pupils are equal, round, and reactive to light.   Cardiovascular:      Rate and Rhythm: Normal rate.   Pulmonary:      Effort: Pulmonary effort is normal.   Abdominal:      Tenderness: There is abdominal tenderness.   Musculoskeletal:      Cervical back: Normal range of motion.      Right lower leg: No edema.      Left lower leg: No edema.   Skin:     General: Skin is warm.   Neurological:      General: No focal deficit present.      Mental Status: She is alert.   Psychiatric:         Mood and Affect: Mood normal.       Fluids    Intake/Output Summary (Last 24 hours) at 9/11/2022 1507  Last data filed at 9/10/2022 2214  Gross per 24 hour   Intake 100 ml   Output --   Net 100 ml       Laboratory  Recent Labs     09/09/22  1710 09/09/22  2057 09/10/22  0337 09/11/22  0243   WBC 8.2  --  7.5 5.8   RBC 2.89*  --  2.95* 2.94*   HEMOGLOBIN 11.2*  --  11.5* 11.3*   HEMATOCRIT 33.2*  --  33.5* 32.6*   .9*  --  113.6* 110.9*   MCH 38.8*  --  39.0* 38.4*   MCHC 33.7  --  34.3 34.7   RDW 66.8*  --  63.7* 59.0*   PLATELETCT 194 223 174 141*   MPV 11.5  --  11.1 11.1     Recent Labs     09/09/22  1710 09/10/22  0337 09/11/22  0243   SODIUM 138 134* 133*   POTASSIUM 3.8 3.3* 3.5*   CHLORIDE 94* 98 99   CO2 11* 17* 20   GLUCOSE 71 83 93   BUN 6* 5* 4*   CREATININE 0.47* 0.61 0.52   CALCIUM 8.5 7.8* 8.9      Recent Labs     09/09/22 2051   INR 1.24*               Imaging  [id number]   Final Result      1.  Patient motion degraded study.      2.  Edema/inflammatory stranding within the periportal region as well as in the area of the pancreatic head and extending into the anterior pararenal space. Consideration should be given for pancreatitis.      3.  Fluid/blood level within the gallbladder likely representing presence of layering sludge.      4.  No intrahepatic biliary ductal dilatation. The common bile duct is not well seen due to inflammatory change and patient motion. No definite common duct stones were biliary ductal dilatation.      5.  Hepatosplenomegaly.      US-RUQ   Final Result      1.  No acute sonographic abnormality. No gallstones.   2.  Hepatomegaly with heterogeneous hepatic echogenicity, which may be due to regional steatosis and/or hepatocellular disease.   3.  No hepatic mass lesions.           Assessment/Plan  * Abdominal pain- (present on admission)  Assessment & Plan  Unclear etiology.  Suspect alcoholic hepatitis.  MRI abdomen and HIDA scan were ordered.  HIDA scan was unable to be done as patient was unable to tolerate boost (fatty meal) prior to imaging    Pyelonephritis  Assessment & Plan  Patient was prescribed a 10-day course of Omnicef on 8/18/2022, which patient did not complete.  Ceftriaxone was ordered on admission.  Urine culture pending    Hypoglycemia  Assessment & Plan  Likely secondary to malnutrition and liver disease. Encourage oral intake, supplements    Nausea and vomiting  Assessment & Plan  Unable to tolerate boastful or discomfort.  Requiring nausea medications as well as narcotic pain medications.  N.p.o. for now.  Monitor    High anion gap metabolic acidosis  Assessment & Plan  Likely starvation ketoacidosis with lactic acidosis.  Continue hydration.  Replete electrolytes.    Hypertension  Assessment & Plan  Amlodipine was started.  Continue for now, and adjust as  needed    Hepatic encephalopathy (HCC)  Assessment & Plan  Ammonia was 64 on 9/9/2022.  Continue lactulose    Alcoholic hepatitis  Assessment & Plan  Hepatitis discrimination score 17. Does not qualify for prednisolone.  Continue symptomatic treatment.       VTE prophylaxis: SCDs/TEDs

## 2022-09-11 NOTE — CARE PLAN
The patient is Watcher - Medium risk of patient condition declining or worsening    Shift Goals  Clinical Goals: pain and nausea control  Patient Goals: rest    Problem: Knowledge Deficit - Standard  Goal: Patient and family/care givers will demonstrate understanding of plan of care, disease process/condition, diagnostic tests and medications  Outcome: Progressing     Problem: Pain - Standard  Goal: Alleviation of pain or a reduction in pain to the patient’s comfort goal  Outcome: Progressing       Progress made toward(s) clinical / shift goals:  RN discussed plan of care with patient and patient verbalized understanding. Patient verbalized pain better controlled with PRN morphine injections.

## 2022-09-11 NOTE — PROGRESS NOTES
Received bedside report and accepted care of patient.  Patient currently resting in bed in no visible or stated distress.  Bed controls on and bed in locked position.  Bed alarm on.  Call light and personal possessions within reach.  Plan of care to include nausea and pain management, assistance with ADL's, and continued NPO status until further notice.  Patient verbalizes agreement with plan of care, and has no additional questions or concerns at this time.  Will continue to update notes/plan of care as needed throughout shift.

## 2022-09-11 NOTE — PROGRESS NOTES
"Patient had requested food/a diet from this RN this afternoon, advising she was \"starving\".  advised that when the patient no longer required IV pain medication for pain control, her diet could be advanced.  The patient expressed her dissatisfaction regarding waiting to advance her diet until her pain was controlled. She made a statement to this RN and the nursing student shadowing today that she was going to call her friend she stays with here in [place], who's  works here at [employer], so they could \"throw a fit\" (to advance her diet?) like they did yesterday to get her MRI done. This RN offered the patient items from a clear liquid diet since she has not had IV pain medication in approx. 6 hours, and she refused. Once this information was shared with , she came to the bedside with this RN and the nursing student and all parties had the opportunity to participate in the plan of care discussion.    Patient was educated by the MD regarding when a diet would be appropriate in the setting of pancreatitis. She asked  about the potential of having pancreatic cancer, and  advised she would look further in to it and discuss it with the radiologist. When the physician and RN left the room, the patient did not want any oral intake, or pain medication. This RN advised the patient to please keep us in the loop regarding her pain and nausea so that we can implement a diet as soon as it is appropriate.   "

## 2022-09-12 LAB
ALBUMIN SERPL BCP-MCNC: 3.6 G/DL (ref 3.2–4.9)
ALBUMIN/GLOB SERPL: 1.1 G/DL
ALP SERPL-CCNC: 106 U/L (ref 30–99)
ALT SERPL-CCNC: 66 U/L (ref 2–50)
ANION GAP SERPL CALC-SCNC: 14 MMOL/L (ref 7–16)
AST SERPL-CCNC: 179 U/L (ref 12–45)
BASOPHILS # BLD AUTO: 1.1 % (ref 0–1.8)
BASOPHILS # BLD: 0.06 K/UL (ref 0–0.12)
BILIRUB SERPL-MCNC: 4.4 MG/DL (ref 0.1–1.5)
BUN SERPL-MCNC: 2 MG/DL (ref 8–22)
CALCIUM SERPL-MCNC: 9.1 MG/DL (ref 8.5–10.5)
CHLORIDE SERPL-SCNC: 98 MMOL/L (ref 96–112)
CO2 SERPL-SCNC: 22 MMOL/L (ref 20–33)
CREAT SERPL-MCNC: 0.4 MG/DL (ref 0.5–1.4)
EOSINOPHIL # BLD AUTO: 0.27 K/UL (ref 0–0.51)
EOSINOPHIL NFR BLD: 4.8 % (ref 0–6.9)
ERYTHROCYTE [DISTWIDTH] IN BLOOD BY AUTOMATED COUNT: 58.4 FL (ref 35.9–50)
GFR SERPLBLD CREATININE-BSD FMLA CKD-EPI: 123 ML/MIN/1.73 M 2
GLOBULIN SER CALC-MCNC: 3.2 G/DL (ref 1.9–3.5)
GLUCOSE SERPL-MCNC: 148 MG/DL (ref 65–99)
HCT VFR BLD AUTO: 32.4 % (ref 37–47)
HGB BLD-MCNC: 11.5 G/DL (ref 12–16)
IMM GRANULOCYTES # BLD AUTO: 0.03 K/UL (ref 0–0.11)
IMM GRANULOCYTES NFR BLD AUTO: 0.5 % (ref 0–0.9)
LYMPHOCYTES # BLD AUTO: 1.07 K/UL (ref 1–4.8)
LYMPHOCYTES NFR BLD: 19.2 % (ref 22–41)
MAGNESIUM SERPL-MCNC: 1.6 MG/DL (ref 1.5–2.5)
MCH RBC QN AUTO: 38.7 PG (ref 27–33)
MCHC RBC AUTO-ENTMCNC: 35.5 G/DL (ref 33.6–35)
MCV RBC AUTO: 109.1 FL (ref 81.4–97.8)
MONOCYTES # BLD AUTO: 0.34 K/UL (ref 0–0.85)
MONOCYTES NFR BLD AUTO: 6.1 % (ref 0–13.4)
NEUTROPHILS # BLD AUTO: 3.8 K/UL (ref 2–7.15)
NEUTROPHILS NFR BLD: 68.3 % (ref 44–72)
NRBC # BLD AUTO: 0 K/UL
NRBC BLD-RTO: 0 /100 WBC
PLATELET # BLD AUTO: 126 K/UL (ref 164–446)
PMV BLD AUTO: 11.9 FL (ref 9–12.9)
POTASSIUM SERPL-SCNC: 3 MMOL/L (ref 3.6–5.5)
PROT SERPL-MCNC: 6.8 G/DL (ref 6–8.2)
RBC # BLD AUTO: 2.97 M/UL (ref 4.2–5.4)
SODIUM SERPL-SCNC: 134 MMOL/L (ref 135–145)
WBC # BLD AUTO: 5.6 K/UL (ref 4.8–10.8)

## 2022-09-12 PROCEDURE — 700111 HCHG RX REV CODE 636 W/ 250 OVERRIDE (IP): Performed by: INTERNAL MEDICINE

## 2022-09-12 PROCEDURE — A9270 NON-COVERED ITEM OR SERVICE: HCPCS | Performed by: INTERNAL MEDICINE

## 2022-09-12 PROCEDURE — 700105 HCHG RX REV CODE 258: Performed by: INTERNAL MEDICINE

## 2022-09-12 PROCEDURE — 99232 SBSQ HOSP IP/OBS MODERATE 35: CPT | Performed by: INTERNAL MEDICINE

## 2022-09-12 PROCEDURE — 770001 HCHG ROOM/CARE - MED/SURG/GYN PRIV*

## 2022-09-12 PROCEDURE — 700101 HCHG RX REV CODE 250: Performed by: INTERNAL MEDICINE

## 2022-09-12 PROCEDURE — 83735 ASSAY OF MAGNESIUM: CPT

## 2022-09-12 PROCEDURE — 80053 COMPREHEN METABOLIC PANEL: CPT

## 2022-09-12 PROCEDURE — 700105 HCHG RX REV CODE 258

## 2022-09-12 PROCEDURE — 700102 HCHG RX REV CODE 250 W/ 637 OVERRIDE(OP): Performed by: INTERNAL MEDICINE

## 2022-09-12 PROCEDURE — 36415 COLL VENOUS BLD VENIPUNCTURE: CPT

## 2022-09-12 PROCEDURE — 85025 COMPLETE CBC W/AUTO DIFF WBC: CPT

## 2022-09-12 RX ORDER — POTASSIUM CHLORIDE 7.45 MG/ML
10 INJECTION INTRAVENOUS
Status: DISPENSED | OUTPATIENT
Start: 2022-09-12 | End: 2022-09-12

## 2022-09-12 RX ORDER — POTASSIUM CHLORIDE 20 MEQ/1
40 TABLET, EXTENDED RELEASE ORAL ONCE
Status: COMPLETED | OUTPATIENT
Start: 2022-09-12 | End: 2022-09-12

## 2022-09-12 RX ADMIN — LACTULOSE 30 ML: 20 SOLUTION ORAL at 05:46

## 2022-09-12 RX ADMIN — MORPHINE SULFATE 1 MG: 4 INJECTION, SOLUTION INTRAMUSCULAR; INTRAVENOUS at 05:40

## 2022-09-12 RX ADMIN — ONDANSETRON 4 MG: 2 INJECTION INTRAMUSCULAR; INTRAVENOUS at 05:35

## 2022-09-12 RX ADMIN — LACTULOSE 30 ML: 20 SOLUTION ORAL at 18:21

## 2022-09-12 RX ADMIN — SENNOSIDES AND DOCUSATE SODIUM 2 TABLET: 50; 8.6 TABLET ORAL at 18:21

## 2022-09-12 RX ADMIN — AMLODIPINE BESYLATE 5 MG: 5 TABLET ORAL at 05:46

## 2022-09-12 RX ADMIN — POTASSIUM CHLORIDE 40 MEQ: 1500 TABLET, EXTENDED RELEASE ORAL at 11:32

## 2022-09-12 RX ADMIN — SUCRALFATE 1 G: 1 TABLET ORAL at 18:21

## 2022-09-12 RX ADMIN — CEFTRIAXONE SODIUM 1 G: 10 INJECTION, POWDER, FOR SOLUTION INTRAVENOUS at 05:47

## 2022-09-12 RX ADMIN — DEXTROSE MONOHYDRATE: 50 INJECTION, SOLUTION INTRAVENOUS at 10:47

## 2022-09-12 RX ADMIN — POTASSIUM CHLORIDE 10 MEQ: 7.46 INJECTION, SOLUTION INTRAVENOUS at 13:19

## 2022-09-12 RX ADMIN — ONDANSETRON 4 MG: 4 TABLET, ORALLY DISINTEGRATING ORAL at 20:49

## 2022-09-12 RX ADMIN — POTASSIUM CHLORIDE 10 MEQ: 7.46 INJECTION, SOLUTION INTRAVENOUS at 14:20

## 2022-09-12 RX ADMIN — DEXTROSE MONOHYDRATE: 50 INJECTION, SOLUTION INTRAVENOUS at 20:52

## 2022-09-12 RX ADMIN — FAMOTIDINE 20 MG: 10 INJECTION, SOLUTION INTRAVENOUS at 05:46

## 2022-09-12 RX ADMIN — SUCRALFATE 1 G: 1 TABLET ORAL at 05:46

## 2022-09-12 RX ADMIN — MORPHINE SULFATE 1 MG: 4 INJECTION, SOLUTION INTRAMUSCULAR; INTRAVENOUS at 09:38

## 2022-09-12 RX ADMIN — OXYCODONE HYDROCHLORIDE 10 MG: 10 TABLET ORAL at 20:49

## 2022-09-12 RX ADMIN — POTASSIUM CHLORIDE 10 MEQ: 7.46 INJECTION, SOLUTION INTRAVENOUS at 13:00

## 2022-09-12 RX ADMIN — FAMOTIDINE 20 MG: 10 INJECTION, SOLUTION INTRAVENOUS at 18:21

## 2022-09-12 RX ADMIN — THIAMINE HYDROCHLORIDE 100 MG: 100 INJECTION, SOLUTION INTRAMUSCULAR; INTRAVENOUS at 05:47

## 2022-09-12 RX ADMIN — ONDANSETRON 4 MG: 4 TABLET, ORALLY DISINTEGRATING ORAL at 16:00

## 2022-09-12 RX ADMIN — ONDANSETRON 4 MG: 2 INJECTION INTRAMUSCULAR; INTRAVENOUS at 09:38

## 2022-09-12 RX ADMIN — ONDANSETRON 4 MG: 2 INJECTION INTRAMUSCULAR; INTRAVENOUS at 00:57

## 2022-09-12 RX ADMIN — LACTULOSE 30 ML: 20 SOLUTION ORAL at 11:32

## 2022-09-12 RX ADMIN — MORPHINE SULFATE 1 MG: 4 INJECTION, SOLUTION INTRAMUSCULAR; INTRAVENOUS at 00:53

## 2022-09-12 RX ADMIN — OXYCODONE HYDROCHLORIDE 10 MG: 10 TABLET ORAL at 16:00

## 2022-09-12 RX ADMIN — POTASSIUM CHLORIDE 10 MEQ: 7.46 INJECTION, SOLUTION INTRAVENOUS at 11:00

## 2022-09-12 RX ADMIN — SUCRALFATE 1 G: 1 TABLET ORAL at 11:32

## 2022-09-12 RX ADMIN — SUCRALFATE 1 G: 1 TABLET ORAL at 00:59

## 2022-09-12 ASSESSMENT — ENCOUNTER SYMPTOMS
EYES NEGATIVE: 1
RESPIRATORY NEGATIVE: 1
ABDOMINAL PAIN: 1
MUSCULOSKELETAL NEGATIVE: 1
PSYCHIATRIC NEGATIVE: 1
CARDIOVASCULAR NEGATIVE: 1
ROS GI COMMENTS: RUQ PAIN
NAUSEA: 1
NEUROLOGICAL NEGATIVE: 1

## 2022-09-12 ASSESSMENT — PAIN DESCRIPTION - PAIN TYPE
TYPE: ACUTE PAIN

## 2022-09-12 NOTE — PROGRESS NOTES
Alert and oriented x4. Safety precautions in placed. Bed in lowest position. Upper side rails up. Treaded socks on. Reinforced the use of call light when needing assistance.

## 2022-09-12 NOTE — PROGRESS NOTES
Hospital Medicine Daily Progress Note    Date of Service  9/12/2022    Chief Complaint  Hien Huynh is a 47 y.o. female admitted 9/9/2022 with right upper quadrant pain, nausea, vomiting, with inability to tolerate oral intake.  She reported 20 pound weight loss in the last few weeks.  She has a history of chronic liver disease.  She reported self-medicating with vodka and orange juice 3 times a day.  She was recently seen in the ER (8/18/2022) and prescribed cefdinir for pyelonephritis, which she did not finish.  Urine culture from 8/18/2022 was positive for Streptococcus agalactiae and E. coli.    Hospital Course  Abdominal ultrasound showed hepatomegaly, no gallstones, hepatic mass or CBD dilation.  Lactic acid was 2.4, ammonia was 64, AST was 313, ALT was 93, total bilirubin was 5.9. UDS was positive for cannabinoids and opiates.      Ceftriaxone was started on admission.  HIDA scan was ordered 9/10/2022, unable to be done as patient was unable to tolerate boost. MRI abdomen from 9/11/2022 showed findings consistent with pancreatitis, fluid/blood levels of the gallbladder representing sludge.     Interval Problem Update  Afebrile.  Potassium is 3.0, sodium is 133.  Abdominal pain is slowly improving.  States that she still has episodes of feeling slightly confused.  Ammonia level was 61 today.  On lactulose.  Tolerating clear liquid diet.  Magnesium is 1.6 today.  Potassium is being replaced orally and IV    I have discussed this patient's plan of care and discharge plan at IDT rounds today with Case Management, Nursing, Nursing leadership, and other members of the IDT team.    Consultants/Specialty  None    Code Status  Full Code    Disposition  Patient is not medically cleared for discharge.   Anticipate discharge to to home with close outpatient follow-up.  I have placed the appropriate orders for post-discharge needs.    Review of Systems  Review of Systems   Constitutional:  Positive for  malaise/fatigue.   HENT: Negative.     Eyes: Negative.    Respiratory: Negative.     Cardiovascular: Negative.    Gastrointestinal:  Positive for abdominal pain and nausea.        RUQ pain   Genitourinary: Negative.    Musculoskeletal: Negative.    Skin: Negative.    Neurological: Negative.    Endo/Heme/Allergies: Negative.    Psychiatric/Behavioral: Negative.        Physical Exam  Temp:  [36.3 °C (97.4 °F)-37.1 °C (98.7 °F)] 36.6 °C (97.8 °F)  Pulse:  [] 92  Resp:  [16-20] 16  BP: (134-147)/(91-98) 147/95  SpO2:  [94 %-96 %] 96 %    Physical Exam  Constitutional:       General: She is in acute distress.      Appearance: She is ill-appearing.   HENT:      Head: Normocephalic.      Nose: Nose normal.      Mouth/Throat:      Mouth: Mucous membranes are moist.   Eyes:      Pupils: Pupils are equal, round, and reactive to light.   Cardiovascular:      Rate and Rhythm: Normal rate.   Pulmonary:      Effort: Pulmonary effort is normal.   Abdominal:      Tenderness: There is abdominal tenderness.   Musculoskeletal:      Cervical back: Normal range of motion.      Right lower leg: No edema.      Left lower leg: No edema.   Skin:     General: Skin is warm.   Neurological:      General: No focal deficit present.      Mental Status: She is alert.   Psychiatric:         Mood and Affect: Mood normal.       Fluids  No intake or output data in the 24 hours ending 09/12/22 1157      Laboratory  Recent Labs     09/10/22  0337 09/11/22  0243 09/12/22  0639   WBC 7.5 5.8 5.6   RBC 2.95* 2.94* 2.97*   HEMOGLOBIN 11.5* 11.3* 11.5*   HEMATOCRIT 33.5* 32.6* 32.4*   .6* 110.9* 109.1*   MCH 39.0* 38.4* 38.7*   MCHC 34.3 34.7 35.5*   RDW 63.7* 59.0* 58.4*   PLATELETCT 174 141* 126*   MPV 11.1 11.1 11.9     Recent Labs     09/10/22  0337 09/11/22  0243 09/12/22  0639   SODIUM 134* 133* 134*   POTASSIUM 3.3* 3.5* 3.0*   CHLORIDE 98 99 98   CO2 17* 20 22   GLUCOSE 83 93 148*   BUN 5* 4* 2*   CREATININE 0.61 0.52 0.40*   CALCIUM  7.8* 8.9 9.1     Recent Labs     09/09/22 2051   INR 1.24*               Imaging  [id number]   Final Result      1.  Patient motion degraded study.      2.  Edema/inflammatory stranding within the periportal region as well as in the area of the pancreatic head and extending into the anterior pararenal space. Consideration should be given for pancreatitis.      3.  Fluid/blood level within the gallbladder likely representing presence of layering sludge.      4.  No intrahepatic biliary ductal dilatation. The common bile duct is not well seen due to inflammatory change and patient motion. No definite common duct stones were biliary ductal dilatation.      5.  Hepatosplenomegaly.      US-RUQ   Final Result      1.  No acute sonographic abnormality. No gallstones.   2.  Hepatomegaly with heterogeneous hepatic echogenicity, which may be due to regional steatosis and/or hepatocellular disease.   3.  No hepatic mass lesions.           Assessment/Plan  * Abdominal pain- (present on admission)  Assessment & Plan  Unclear etiology.  Suspect alcoholic hepatitis. HIDA scan was unable to be done as patient was unable to tolerate boost (fatty meal) prior to imaging. MRI abdomen from 9/11/2022 showed findings consistent with pancreatitis, fluid/blood levels of the gallbladder representing sludge.    Pyelonephritis  Assessment & Plan  Patient was prescribed a 10-day course of Omnicef on 8/18/2022, which patient did not complete.  Ceftriaxone was ordered on admission.  Urine culture pending    Hypoglycemia  Assessment & Plan  Likely secondary to malnutrition and liver disease. Encourage oral intake, supplements    Nausea and vomiting  Assessment & Plan  Unable to tolerate boastful or discomfort.  Requiring nausea medications as well as narcotic pain medications.  N.p.o. for now.  Monitor    High anion gap metabolic acidosis  Assessment & Plan  Likely starvation ketoacidosis with lactic acidosis.  Continue hydration.  Replete  electrolytes.    Hypertension  Assessment & Plan  Amlodipine was started.  Continue for now, and adjust as needed    Hepatic encephalopathy (HCC)  Assessment & Plan  Still has episodes of confusion, overall slightly improving. Ammonia was 61 on 9/12/2022.  Continue lactulose    Alcoholic hepatitis  Assessment & Plan  Hepatitis discrimination score 17. Does not qualify for prednisolone.  Continue symptomatic treatment.       VTE prophylaxis: SCDs/TEDs

## 2022-09-12 NOTE — DIETARY
Nutrition Services:  Patient remains NPO at this time per MD.  Nausea and vomiting continue as well as abdominal pain per MD.  Concern for refeeding syndrome.     Pertinent Labs: Sodium: 134, Potassium: 3.0, Phos: 0.4, Ma.6    D5 infusion currently @ 83 ml/hr providing 339 kcal/day. KCL and Kdur ordered. Thiamine IV given this morning. Stopped @ 0617.       Plan/Recommend:  Advance diet as medically feasible.  Monitor for refeeding: Order BMP w/ Mg and Phos x 7 days. Replete K, Phos and Mg prn. Supplement 100 mg Thiamine x 7 days to reduce risk of refeeding.  If unable to advance diet please consider TPN to optimize nutrition given severe malnutrition diagnosis.     RD monitoring

## 2022-09-12 NOTE — CARE PLAN
The patient is Stable - Low risk of patient condition declining or worsening    Shift Goals  Clinical Goals: pain management  Patient Goals: pain management  Family Goals: None present    Progress made toward(s) clinical / shift goals:  Patient updated on plan of care - lowering ammonia level and replacing potassium. Patient receiving PRN pain medications.     Problem: Knowledge Deficit - Standard  Goal: Patient and family/care givers will demonstrate understanding of plan of care, disease process/condition, diagnostic tests and medications  Outcome: Progressing     Problem: Pain - Standard  Goal: Alleviation of pain or a reduction in pain to the patient’s comfort goal  Outcome: Progressing     Problem: Skin Integrity  Goal: Skin integrity is maintained or improved  Outcome: Progressing     Problem: Fall Risk  Goal: Patient will remain free from falls  Outcome: Progressing     Patient is not progressing towards the following goals:

## 2022-09-12 NOTE — CARE PLAN
The patient is Stable - Low risk of patient condition declining or worsening    Shift Goals  Clinical Goals: comfort  Patient Goals: pt will be able to rest and sleep comfortably  Family Goals: None present    Progress made toward(s) clinical / shift goals:      Patient is not progressing towards the following goals:

## 2022-09-13 PROBLEM — E83.39 HYPOPHOSPHATEMIA: Status: ACTIVE | Noted: 2022-09-13

## 2022-09-13 PROBLEM — E87.6 HYPOKALEMIA: Status: ACTIVE | Noted: 2022-09-13

## 2022-09-13 LAB
ANION GAP SERPL CALC-SCNC: 11 MMOL/L (ref 7–16)
ANION GAP SERPL CALC-SCNC: 9 MMOL/L (ref 7–16)
BASOPHILS # BLD AUTO: 0.5 % (ref 0–1.8)
BASOPHILS # BLD: 0.03 K/UL (ref 0–0.12)
BUN SERPL-MCNC: 2 MG/DL (ref 8–22)
BUN SERPL-MCNC: 3 MG/DL (ref 8–22)
CALCIUM SERPL-MCNC: 9 MG/DL (ref 8.5–10.5)
CALCIUM SERPL-MCNC: 9.3 MG/DL (ref 8.5–10.5)
CHLORIDE SERPL-SCNC: 100 MMOL/L (ref 96–112)
CHLORIDE SERPL-SCNC: 101 MMOL/L (ref 96–112)
CO2 SERPL-SCNC: 22 MMOL/L (ref 20–33)
CO2 SERPL-SCNC: 22 MMOL/L (ref 20–33)
CREAT SERPL-MCNC: 0.45 MG/DL (ref 0.5–1.4)
CREAT SERPL-MCNC: 0.47 MG/DL (ref 0.5–1.4)
EOSINOPHIL # BLD AUTO: 0.3 K/UL (ref 0–0.51)
EOSINOPHIL NFR BLD: 5.3 % (ref 0–6.9)
ERYTHROCYTE [DISTWIDTH] IN BLOOD BY AUTOMATED COUNT: 58.9 FL (ref 35.9–50)
GFR SERPLBLD CREATININE-BSD FMLA CKD-EPI: 119 ML/MIN/1.73 M 2
GFR SERPLBLD CREATININE-BSD FMLA CKD-EPI: 120 ML/MIN/1.73 M 2
GLUCOSE SERPL-MCNC: 107 MG/DL (ref 65–99)
GLUCOSE SERPL-MCNC: 117 MG/DL (ref 65–99)
HCT VFR BLD AUTO: 32.7 % (ref 37–47)
HGB BLD-MCNC: 11.3 G/DL (ref 12–16)
IMM GRANULOCYTES # BLD AUTO: 0.06 K/UL (ref 0–0.11)
IMM GRANULOCYTES NFR BLD AUTO: 1.1 % (ref 0–0.9)
LYMPHOCYTES # BLD AUTO: 1.37 K/UL (ref 1–4.8)
LYMPHOCYTES NFR BLD: 24.4 % (ref 22–41)
MCH RBC QN AUTO: 38 PG (ref 27–33)
MCHC RBC AUTO-ENTMCNC: 34.6 G/DL (ref 33.6–35)
MCV RBC AUTO: 110.1 FL (ref 81.4–97.8)
MONOCYTES # BLD AUTO: 0.45 K/UL (ref 0–0.85)
MONOCYTES NFR BLD AUTO: 8 % (ref 0–13.4)
NEUTROPHILS # BLD AUTO: 3.41 K/UL (ref 2–7.15)
NEUTROPHILS NFR BLD: 60.7 % (ref 44–72)
NRBC # BLD AUTO: 0 K/UL
NRBC BLD-RTO: 0 /100 WBC
PHOSPHATE SERPL-MCNC: 1.3 MG/DL (ref 2.5–4.5)
PHOSPHATE SERPL-MCNC: <0.4 MG/DL (ref 2.5–4.5)
PLATELET # BLD AUTO: 128 K/UL (ref 164–446)
PMV BLD AUTO: 11.8 FL (ref 9–12.9)
POTASSIUM SERPL-SCNC: 3.5 MMOL/L (ref 3.6–5.5)
POTASSIUM SERPL-SCNC: 3.6 MMOL/L (ref 3.6–5.5)
RBC # BLD AUTO: 2.97 M/UL (ref 4.2–5.4)
SODIUM SERPL-SCNC: 132 MMOL/L (ref 135–145)
SODIUM SERPL-SCNC: 133 MMOL/L (ref 135–145)
WBC # BLD AUTO: 5.6 K/UL (ref 4.8–10.8)

## 2022-09-13 PROCEDURE — 700111 HCHG RX REV CODE 636 W/ 250 OVERRIDE (IP): Performed by: INTERNAL MEDICINE

## 2022-09-13 PROCEDURE — 700102 HCHG RX REV CODE 250 W/ 637 OVERRIDE(OP): Performed by: INTERNAL MEDICINE

## 2022-09-13 PROCEDURE — 84100 ASSAY OF PHOSPHORUS: CPT | Mod: 91

## 2022-09-13 PROCEDURE — 36415 COLL VENOUS BLD VENIPUNCTURE: CPT

## 2022-09-13 PROCEDURE — 85025 COMPLETE CBC W/AUTO DIFF WBC: CPT

## 2022-09-13 PROCEDURE — 80048 BASIC METABOLIC PNL TOTAL CA: CPT | Mod: 91

## 2022-09-13 PROCEDURE — A9270 NON-COVERED ITEM OR SERVICE: HCPCS | Performed by: INTERNAL MEDICINE

## 2022-09-13 PROCEDURE — 99232 SBSQ HOSP IP/OBS MODERATE 35: CPT | Performed by: STUDENT IN AN ORGANIZED HEALTH CARE EDUCATION/TRAINING PROGRAM

## 2022-09-13 PROCEDURE — 700102 HCHG RX REV CODE 250 W/ 637 OVERRIDE(OP): Performed by: STUDENT IN AN ORGANIZED HEALTH CARE EDUCATION/TRAINING PROGRAM

## 2022-09-13 PROCEDURE — 700101 HCHG RX REV CODE 250

## 2022-09-13 PROCEDURE — 770001 HCHG ROOM/CARE - MED/SURG/GYN PRIV*

## 2022-09-13 PROCEDURE — A9270 NON-COVERED ITEM OR SERVICE: HCPCS | Performed by: STUDENT IN AN ORGANIZED HEALTH CARE EDUCATION/TRAINING PROGRAM

## 2022-09-13 PROCEDURE — 700105 HCHG RX REV CODE 258

## 2022-09-13 RX ORDER — GAUZE BANDAGE 2" X 2"
100 BANDAGE TOPICAL DAILY
Status: DISCONTINUED | OUTPATIENT
Start: 2022-09-13 | End: 2022-09-15 | Stop reason: HOSPADM

## 2022-09-13 RX ORDER — LACTULOSE 20 G/30ML
30 SOLUTION ORAL 4 TIMES DAILY
Status: DISCONTINUED | OUTPATIENT
Start: 2022-09-13 | End: 2022-09-14

## 2022-09-13 RX ORDER — FOLIC ACID 1 MG/1
1 TABLET ORAL DAILY
Status: DISCONTINUED | OUTPATIENT
Start: 2022-09-13 | End: 2022-09-15 | Stop reason: HOSPADM

## 2022-09-13 RX ADMIN — DIBASIC SODIUM PHOSPHATE, MONOBASIC POTASSIUM PHOSPHATE AND MONOBASIC SODIUM PHOSPHATE 500 MG: 852; 155; 130 TABLET ORAL at 12:36

## 2022-09-13 RX ADMIN — ONDANSETRON 4 MG: 4 TABLET, ORALLY DISINTEGRATING ORAL at 10:39

## 2022-09-13 RX ADMIN — SENNOSIDES AND DOCUSATE SODIUM 2 TABLET: 50; 8.6 TABLET ORAL at 04:57

## 2022-09-13 RX ADMIN — OXYCODONE HYDROCHLORIDE 10 MG: 10 TABLET ORAL at 01:08

## 2022-09-13 RX ADMIN — MORPHINE SULFATE 1 MG: 4 INJECTION, SOLUTION INTRAMUSCULAR; INTRAVENOUS at 20:11

## 2022-09-13 RX ADMIN — OXYCODONE HYDROCHLORIDE 10 MG: 10 TABLET ORAL at 10:39

## 2022-09-13 RX ADMIN — FOLIC ACID 1 MG: 1 TABLET ORAL at 13:38

## 2022-09-13 RX ADMIN — SUCRALFATE 1 G: 1 TABLET ORAL at 04:57

## 2022-09-13 RX ADMIN — SUCRALFATE 1 G: 1 TABLET ORAL at 12:36

## 2022-09-13 RX ADMIN — LACTULOSE 30 ML: 20 SOLUTION ORAL at 12:35

## 2022-09-13 RX ADMIN — ONDANSETRON 4 MG: 4 TABLET, ORALLY DISINTEGRATING ORAL at 17:35

## 2022-09-13 RX ADMIN — POTASSIUM PHOSPHATE, MONOBASIC AND POTASSIUM PHOSPHATE, DIBASIC 30 MMOL: 224; 236 INJECTION, SOLUTION, CONCENTRATE INTRAVENOUS at 08:25

## 2022-09-13 RX ADMIN — OXYCODONE HYDROCHLORIDE 10 MG: 10 TABLET ORAL at 05:07

## 2022-09-13 RX ADMIN — AMLODIPINE BESYLATE 5 MG: 5 TABLET ORAL at 04:57

## 2022-09-13 RX ADMIN — FAMOTIDINE 20 MG: 10 INJECTION, SOLUTION INTRAVENOUS at 17:19

## 2022-09-13 RX ADMIN — LACTULOSE 30 ML: 20 SOLUTION ORAL at 04:56

## 2022-09-13 RX ADMIN — DEXTROSE MONOHYDRATE: 50 INJECTION, SOLUTION INTRAVENOUS at 08:25

## 2022-09-13 RX ADMIN — ONDANSETRON 4 MG: 4 TABLET, ORALLY DISINTEGRATING ORAL at 05:07

## 2022-09-13 RX ADMIN — FAMOTIDINE 20 MG: 10 INJECTION, SOLUTION INTRAVENOUS at 04:57

## 2022-09-13 RX ADMIN — CEFTRIAXONE SODIUM 1 G: 10 INJECTION, POWDER, FOR SOLUTION INTRAVENOUS at 04:57

## 2022-09-13 RX ADMIN — OXYCODONE HYDROCHLORIDE 10 MG: 10 TABLET ORAL at 16:11

## 2022-09-13 RX ADMIN — DIBASIC SODIUM PHOSPHATE, MONOBASIC POTASSIUM PHOSPHATE AND MONOBASIC SODIUM PHOSPHATE 500 MG: 852; 155; 130 TABLET ORAL at 08:25

## 2022-09-13 RX ADMIN — SUCRALFATE 1 G: 1 TABLET ORAL at 00:23

## 2022-09-13 RX ADMIN — Medication 100 MG: at 13:37

## 2022-09-13 ASSESSMENT — PAIN DESCRIPTION - PAIN TYPE
TYPE: ACUTE PAIN

## 2022-09-13 ASSESSMENT — ENCOUNTER SYMPTOMS
ABDOMINAL PAIN: 1
ROS GI COMMENTS: RUQ PAIN
RESPIRATORY NEGATIVE: 1
EYES NEGATIVE: 1
PSYCHIATRIC NEGATIVE: 1
MUSCULOSKELETAL NEGATIVE: 1
NEUROLOGICAL NEGATIVE: 1
NAUSEA: 1
CARDIOVASCULAR NEGATIVE: 1

## 2022-09-13 NOTE — DIETARY
Nutrition Services:  Patient now on full liquids.  Met with patient at bedside.  She c/o wanting solid food and fixated on discharging.  Concern for refeeding syndrome continues.    Phos 0.4 today.  Replacement ordered  Potassium 3.5 - improving.    D5 infusion currently @ 83 ml/hr providing 339 kcal/day.  D/W MD, recommend restarting Thiamine 100 mg/d    Plan/Recommend:  Advance diet as medically feasible.  Continue to monitor for refeeding: Order BMP w/ Mg and Phos x 3-5 days. Replete K, Phos and Mg prn. Supplement 100 mg Thiamine x 7 days to reduce risk of refeeding.    RD monitoring

## 2022-09-13 NOTE — PROGRESS NOTES
Patient scored as moderate fall risk. Patient educated on fall risk assessment and fall risk. Patient understands, declining bed alarm at this time. Agreeable to wear skid socks out of bed and call for help, and wear fall risk bracelet.

## 2022-09-13 NOTE — PROGRESS NOTES
Critical lab phosphorus <0.4 called to this nurse by lab at 0621 and reported results to MD Persaud at 0627.

## 2022-09-13 NOTE — PROGRESS NOTES
Patient is alert and oriented x4. Up independently. Receiving PRN pain medications. Full liquid diet - tolerating well. Needs to have HIDA scan completed. 4 bags of potassium and oral potassium replaced. Call light within reach.

## 2022-09-13 NOTE — PROGRESS NOTES
Hospital Medicine Daily Progress Note    Date of Service  9/13/2022    Chief Complaint  Hien Huynh is a 47 y.o. female admitted 9/9/2022 with right upper quadrant pain, nausea, vomiting, with inability to tolerate oral intake.  She reported 20 pound weight loss in the last few weeks.  She has a history of chronic liver disease.  She reported self-medicating with vodka and orange juice 3 times a day.  She was recently seen in the ER (8/18/2022) and prescribed cefdinir for pyelonephritis, which she did not finish.  Urine culture from 8/18/2022 was positive for Streptococcus agalactiae and E. coli.    Hospital Course  Abdominal ultrasound showed hepatomegaly, no gallstones, hepatic mass or CBD dilation.  Lactic acid was 2.4, ammonia was 64, AST was 313, ALT was 93, total bilirubin was 5.9. UDS was positive for cannabinoids and opiates.      Ceftriaxone was started on admission.  MRI abdomen from 9/11/2022 showed findings consistent with pancreatitis, fluid/blood levels of the gallbladder representing sludge and hepatosplenomegaly.     Interval Problem Update  Seen patient at bedside.  Noted Phos <0.4, replaced with iv and po phos  Repeated lab in pm  Advance diet to soft diet.  Inc lactulose given elevated ammonia  Dc IVF D5? Unclear why she is on D5 for a few days.     I have discussed this patient's plan of care and discharge plan at IDT rounds today with Case Management, Nursing, Nursing leadership, and other members of the IDT team.    Consultants/Specialty  None    Code Status  Full Code    Disposition  Patient is not medically cleared for discharge.   Anticipate discharge to to home with close outpatient follow-up.  I have placed the appropriate orders for post-discharge needs.    Review of Systems  Review of Systems   Constitutional:  Positive for malaise/fatigue.   HENT: Negative.     Eyes: Negative.    Respiratory: Negative.     Cardiovascular: Negative.    Gastrointestinal:  Positive for abdominal pain  and nausea.        RUQ pain   Genitourinary: Negative.    Musculoskeletal: Negative.    Skin: Negative.    Neurological: Negative.    Endo/Heme/Allergies: Negative.    Psychiatric/Behavioral: Negative.        Physical Exam  Temp:  [36.3 °C (97.3 °F)-36.5 °C (97.7 °F)] 36.4 °C (97.5 °F)  Pulse:  [] 87  Resp:  [16-18] 18  BP: (121-133)/() 121/95  SpO2:  [96 %-98 %] 96 %    Physical Exam  Constitutional:       General: She is in acute distress.      Appearance: She is ill-appearing.   HENT:      Head: Normocephalic.      Nose: Nose normal.      Mouth/Throat:      Mouth: Mucous membranes are moist.   Eyes:      Pupils: Pupils are equal, round, and reactive to light.   Cardiovascular:      Rate and Rhythm: Normal rate.   Pulmonary:      Effort: Pulmonary effort is normal.   Abdominal:      Tenderness: There is abdominal tenderness.   Musculoskeletal:      Cervical back: Normal range of motion.      Right lower leg: No edema.      Left lower leg: No edema.   Skin:     General: Skin is warm.   Neurological:      General: No focal deficit present.      Mental Status: She is alert.   Psychiatric:         Mood and Affect: Mood normal.       Fluids    Intake/Output Summary (Last 24 hours) at 9/13/2022 1528  Last data filed at 9/13/2022 0935  Gross per 24 hour   Intake 120 ml   Output --   Net 120 ml         Laboratory  Recent Labs     09/11/22  0243 09/12/22  0639 09/13/22  0204   WBC 5.8 5.6 5.6   RBC 2.94* 2.97* 2.97*   HEMOGLOBIN 11.3* 11.5* 11.3*   HEMATOCRIT 32.6* 32.4* 32.7*   .9* 109.1* 110.1*   MCH 38.4* 38.7* 38.0*   MCHC 34.7 35.5* 34.6   RDW 59.0* 58.4* 58.9*   PLATELETCT 141* 126* 128*   MPV 11.1 11.9 11.8       Recent Labs     09/11/22  0243 09/12/22  0639 09/13/22  0204   SODIUM 133* 134* 132*   POTASSIUM 3.5* 3.0* 3.5*   CHLORIDE 99 98 101   CO2 20 22 22   GLUCOSE 93 148* 117*   BUN 4* 2* 2*   CREATININE 0.52 0.40* 0.45*   CALCIUM 8.9 9.1 9.3                       Imaging  [id number]    Final Result      1.  Patient motion degraded study.      2.  Edema/inflammatory stranding within the periportal region as well as in the area of the pancreatic head and extending into the anterior pararenal space. Consideration should be given for pancreatitis.      3.  Fluid/blood level within the gallbladder likely representing presence of layering sludge.      4.  No intrahepatic biliary ductal dilatation. The common bile duct is not well seen due to inflammatory change and patient motion. No definite common duct stones were biliary ductal dilatation.      5.  Hepatosplenomegaly.      US-RUQ   Final Result      1.  No acute sonographic abnormality. No gallstones.   2.  Hepatomegaly with heterogeneous hepatic echogenicity, which may be due to regional steatosis and/or hepatocellular disease.   3.  No hepatic mass lesions.             Assessment/Plan  * Abdominal pain- (present on admission)  Assessment & Plan  Likely sec to pancreatitis given significant alcohol abuse history.   MRI abdomen from 9/11/2022 showed findings consistent with pancreatitis, fluid/blood levels of the gallbladder representing sludge.  Abd US: hepatomegaly with heterogeneous hepatic echogenicity. No hepatic mass. No gallstone    Advance diet as tolerated  Symptomatic control      Hypophosphatemia  Assessment & Plan  Profound. Replace as indicated  Poor oral intake, ordered thiamine       Hypokalemia  Assessment & Plan  Replace as indicated    Pyelonephritis  Assessment & Plan  Patient was prescribed a 10-day course of Omnicef on 8/18/2022, which patient did not complete.  Ceftriaxone was ordered on admission.     Hypoglycemia  Assessment & Plan  Likely secondary to malnutrition and liver disease. Encourage oral intake, supplements    Nausea and vomiting  Assessment & Plan  Advance diet as tolerated  Antiemetic     High anion gap metabolic acidosis  Assessment & Plan  Likely starvation ketoacidosis with lactic acidosis.Received hydration.   Replete electrolytes.    Hypertension  Assessment & Plan  Amlodipine was started.  Continue for now, and adjust as needed    Hepatic encephalopathy (HCC)  Assessment & Plan  Still has episodes of confusion, overall slightly improving. Ammonia was 61 on 9/12/2022.  Continue lactulose, titrate as goal of 2-3 loose bowel movements per day    Alcoholic hepatitis  Assessment & Plan  Hepatitis discrimination score 17. Does not qualify for prednisolone.  Continue symptomatic treatment.  Alcohol cessation advised       VTE prophylaxis: SCDs/TEDs

## 2022-09-13 NOTE — CARE PLAN
The patient is Stable - Low risk of patient condition declining or worsening    Shift Goals  Clinical Goals: PO intake, pain, nausea management  Patient Goals: pain, nausea control  Family Goals: None present    Progress made toward(s) clinical / shift goals:  PRN oxy on board, nausea being managed with PRN zofran, she continues to verbalize her needs appropriately.     Patient is not progressing towards the following goals: N/A

## 2022-09-14 PROBLEM — E83.42 HYPOMAGNESEMIA: Status: ACTIVE | Noted: 2022-09-14

## 2022-09-14 LAB
ALBUMIN SERPL BCP-MCNC: 3.4 G/DL (ref 3.2–4.9)
ALBUMIN/GLOB SERPL: 1 G/DL
ALP SERPL-CCNC: 108 U/L (ref 30–99)
ALT SERPL-CCNC: 56 U/L (ref 2–50)
AMMONIA PLAS-SCNC: 62 UMOL/L (ref 11–45)
ANION GAP SERPL CALC-SCNC: 13 MMOL/L (ref 7–16)
AST SERPL-CCNC: 139 U/L (ref 12–45)
BILIRUB SERPL-MCNC: 2.2 MG/DL (ref 0.1–1.5)
BUN SERPL-MCNC: 5 MG/DL (ref 8–22)
CALCIUM SERPL-MCNC: 8.9 MG/DL (ref 8.5–10.5)
CHLORIDE SERPL-SCNC: 101 MMOL/L (ref 96–112)
CO2 SERPL-SCNC: 22 MMOL/L (ref 20–33)
CREAT SERPL-MCNC: 0.48 MG/DL (ref 0.5–1.4)
EKG IMPRESSION: NORMAL
ERYTHROCYTE [DISTWIDTH] IN BLOOD BY AUTOMATED COUNT: 63.4 FL (ref 35.9–50)
GFR SERPLBLD CREATININE-BSD FMLA CKD-EPI: 118 ML/MIN/1.73 M 2
GLOBULIN SER CALC-MCNC: 3.4 G/DL (ref 1.9–3.5)
GLUCOSE SERPL-MCNC: 113 MG/DL (ref 65–99)
HCG SERPL QL: NEGATIVE
HCT VFR BLD AUTO: 33 % (ref 37–47)
HGB BLD-MCNC: 11.4 G/DL (ref 12–16)
MAGNESIUM SERPL-MCNC: 1.4 MG/DL (ref 1.5–2.5)
MCH RBC QN AUTO: 38.4 PG (ref 27–33)
MCHC RBC AUTO-ENTMCNC: 34.5 G/DL (ref 33.6–35)
MCV RBC AUTO: 111.1 FL (ref 81.4–97.8)
PHOSPHATE SERPL-MCNC: 1.4 MG/DL (ref 2.5–4.5)
PLATELET # BLD AUTO: 135 K/UL (ref 164–446)
PLATELET # BLD AUTO: 156 K/UL (ref 164–446)
PLATELET # BLD AUTO: NORMAL K/UL (ref 164–446)
PMV BLD AUTO: 11.7 FL (ref 9–12.9)
POTASSIUM SERPL-SCNC: 3.4 MMOL/L (ref 3.6–5.5)
PROT SERPL-MCNC: 6.8 G/DL (ref 6–8.2)
RBC # BLD AUTO: 2.97 M/UL (ref 4.2–5.4)
SODIUM SERPL-SCNC: 136 MMOL/L (ref 135–145)
WBC # BLD AUTO: 5.9 K/UL (ref 4.8–10.8)

## 2022-09-14 PROCEDURE — 84520 ASSAY OF UREA NITROGEN: CPT

## 2022-09-14 PROCEDURE — 84450 TRANSFERASE (AST) (SGOT): CPT

## 2022-09-14 PROCEDURE — 700101 HCHG RX REV CODE 250: Performed by: STUDENT IN AN ORGANIZED HEALTH CARE EDUCATION/TRAINING PROGRAM

## 2022-09-14 PROCEDURE — 93010 ELECTROCARDIOGRAM REPORT: CPT | Performed by: INTERNAL MEDICINE

## 2022-09-14 PROCEDURE — 700111 HCHG RX REV CODE 636 W/ 250 OVERRIDE (IP): Performed by: INTERNAL MEDICINE

## 2022-09-14 PROCEDURE — 82977 ASSAY OF GGT: CPT

## 2022-09-14 PROCEDURE — 700102 HCHG RX REV CODE 250 W/ 637 OVERRIDE(OP): Performed by: STUDENT IN AN ORGANIZED HEALTH CARE EDUCATION/TRAINING PROGRAM

## 2022-09-14 PROCEDURE — 80053 COMPREHEN METABOLIC PANEL: CPT

## 2022-09-14 PROCEDURE — 700105 HCHG RX REV CODE 258: Performed by: STUDENT IN AN ORGANIZED HEALTH CARE EDUCATION/TRAINING PROGRAM

## 2022-09-14 PROCEDURE — 99232 SBSQ HOSP IP/OBS MODERATE 35: CPT | Performed by: STUDENT IN AN ORGANIZED HEALTH CARE EDUCATION/TRAINING PROGRAM

## 2022-09-14 PROCEDURE — 82140 ASSAY OF AMMONIA: CPT

## 2022-09-14 PROCEDURE — 83883 ASSAY NEPHELOMETRY NOT SPEC: CPT

## 2022-09-14 PROCEDURE — 85049 AUTOMATED PLATELET COUNT: CPT

## 2022-09-14 PROCEDURE — A9270 NON-COVERED ITEM OR SERVICE: HCPCS | Performed by: INTERNAL MEDICINE

## 2022-09-14 PROCEDURE — 84100 ASSAY OF PHOSPHORUS: CPT

## 2022-09-14 PROCEDURE — 700102 HCHG RX REV CODE 250 W/ 637 OVERRIDE(OP): Performed by: INTERNAL MEDICINE

## 2022-09-14 PROCEDURE — 84460 ALANINE AMINO (ALT) (SGPT): CPT

## 2022-09-14 PROCEDURE — 770001 HCHG ROOM/CARE - MED/SURG/GYN PRIV*

## 2022-09-14 PROCEDURE — 85027 COMPLETE CBC AUTOMATED: CPT

## 2022-09-14 PROCEDURE — 700101 HCHG RX REV CODE 250: Performed by: INTERNAL MEDICINE

## 2022-09-14 PROCEDURE — A9270 NON-COVERED ITEM OR SERVICE: HCPCS | Performed by: STUDENT IN AN ORGANIZED HEALTH CARE EDUCATION/TRAINING PROGRAM

## 2022-09-14 PROCEDURE — 700111 HCHG RX REV CODE 636 W/ 250 OVERRIDE (IP): Performed by: STUDENT IN AN ORGANIZED HEALTH CARE EDUCATION/TRAINING PROGRAM

## 2022-09-14 PROCEDURE — 83735 ASSAY OF MAGNESIUM: CPT

## 2022-09-14 PROCEDURE — 84703 CHORIONIC GONADOTROPIN ASSAY: CPT

## 2022-09-14 PROCEDURE — 93005 ELECTROCARDIOGRAM TRACING: CPT | Performed by: STUDENT IN AN ORGANIZED HEALTH CARE EDUCATION/TRAINING PROGRAM

## 2022-09-14 RX ORDER — LACTULOSE 20 G/30ML
45 SOLUTION ORAL 3 TIMES DAILY
Status: DISCONTINUED | OUTPATIENT
Start: 2022-09-14 | End: 2022-09-15 | Stop reason: HOSPADM

## 2022-09-14 RX ORDER — MAGNESIUM SULFATE HEPTAHYDRATE 40 MG/ML
2 INJECTION, SOLUTION INTRAVENOUS ONCE
Status: COMPLETED | OUTPATIENT
Start: 2022-09-14 | End: 2022-09-14

## 2022-09-14 RX ORDER — MORPHINE SULFATE 4 MG/ML
2 INJECTION INTRAVENOUS EVERY 6 HOURS PRN
Status: DISCONTINUED | OUTPATIENT
Start: 2022-09-14 | End: 2022-09-15 | Stop reason: HOSPADM

## 2022-09-14 RX ADMIN — LACTULOSE 45 ML: 20 SOLUTION ORAL at 17:27

## 2022-09-14 RX ADMIN — DIBASIC SODIUM PHOSPHATE, MONOBASIC POTASSIUM PHOSPHATE AND MONOBASIC SODIUM PHOSPHATE 500 MG: 852; 155; 130 TABLET ORAL at 13:26

## 2022-09-14 RX ADMIN — DIBASIC SODIUM PHOSPHATE, MONOBASIC POTASSIUM PHOSPHATE AND MONOBASIC SODIUM PHOSPHATE 500 MG: 852; 155; 130 TABLET ORAL at 04:45

## 2022-09-14 RX ADMIN — ONDANSETRON 4 MG: 2 INJECTION INTRAMUSCULAR; INTRAVENOUS at 13:25

## 2022-09-14 RX ADMIN — ONDANSETRON 4 MG: 2 INJECTION INTRAMUSCULAR; INTRAVENOUS at 00:28

## 2022-09-14 RX ADMIN — FAMOTIDINE 20 MG: 10 INJECTION, SOLUTION INTRAVENOUS at 04:41

## 2022-09-14 RX ADMIN — ONDANSETRON 4 MG: 2 INJECTION INTRAMUSCULAR; INTRAVENOUS at 08:45

## 2022-09-14 RX ADMIN — ONDANSETRON 4 MG: 2 INJECTION INTRAMUSCULAR; INTRAVENOUS at 04:39

## 2022-09-14 RX ADMIN — FAMOTIDINE 20 MG: 10 INJECTION, SOLUTION INTRAVENOUS at 17:27

## 2022-09-14 RX ADMIN — MORPHINE SULFATE 2 MG: 4 INJECTION INTRAVENOUS at 16:52

## 2022-09-14 RX ADMIN — MAGNESIUM SULFATE HEPTAHYDRATE 2 G: 40 INJECTION, SOLUTION INTRAVENOUS at 08:45

## 2022-09-14 RX ADMIN — MORPHINE SULFATE 1 MG: 4 INJECTION, SOLUTION INTRAMUSCULAR; INTRAVENOUS at 08:44

## 2022-09-14 RX ADMIN — SUCRALFATE 1 G: 1 TABLET ORAL at 13:26

## 2022-09-14 RX ADMIN — CEFTRIAXONE SODIUM 1 G: 10 INJECTION, POWDER, FOR SOLUTION INTRAVENOUS at 05:00

## 2022-09-14 RX ADMIN — FOLIC ACID 1 MG: 1 TABLET ORAL at 04:45

## 2022-09-14 RX ADMIN — LACTULOSE 45 ML: 20 SOLUTION ORAL at 13:25

## 2022-09-14 RX ADMIN — SUCRALFATE 1 G: 1 TABLET ORAL at 23:23

## 2022-09-14 RX ADMIN — Medication 100 MG: at 04:45

## 2022-09-14 RX ADMIN — SUCRALFATE 1 G: 1 TABLET ORAL at 05:00

## 2022-09-14 RX ADMIN — THERA TABS 1 TABLET: TAB at 04:45

## 2022-09-14 RX ADMIN — POTASSIUM PHOSPHATE, MONOBASIC AND POTASSIUM PHOSPHATE, DIBASIC 15 MMOL: 224; 236 INJECTION, SOLUTION, CONCENTRATE INTRAVENOUS at 13:26

## 2022-09-14 RX ADMIN — AMLODIPINE BESYLATE 5 MG: 5 TABLET ORAL at 04:45

## 2022-09-14 RX ADMIN — MORPHINE SULFATE 1 MG: 4 INJECTION, SOLUTION INTRAMUSCULAR; INTRAVENOUS at 00:28

## 2022-09-14 RX ADMIN — MORPHINE SULFATE 1 MG: 4 INJECTION, SOLUTION INTRAMUSCULAR; INTRAVENOUS at 04:39

## 2022-09-14 ASSESSMENT — ENCOUNTER SYMPTOMS
PSYCHIATRIC NEGATIVE: 1
NEUROLOGICAL NEGATIVE: 1
ROS GI COMMENTS: RUQ PAIN
EYES NEGATIVE: 1
CARDIOVASCULAR NEGATIVE: 1
ABDOMINAL PAIN: 1
NAUSEA: 1
RESPIRATORY NEGATIVE: 1
MUSCULOSKELETAL NEGATIVE: 1

## 2022-09-14 ASSESSMENT — PAIN DESCRIPTION - PAIN TYPE: TYPE: ACUTE PAIN

## 2022-09-14 NOTE — PROGRESS NOTES
Pt sibling is at bedside with patient and asking questions regarding update on patient. All questions answered and was told if they want to have explanation of the result of MRI, this RN will pass report in day shift to have doctors explained to them. Informed that they can visit again tomorrow in the morning for further explanation of pt's latest conditions. Pt and sibling agreed.

## 2022-09-14 NOTE — PROGRESS NOTES
"Hospital Medicine Daily Progress Note    Date of Service  9/14/2022    Chief Complaint  Hien Hyunh is a 47 y.o. female admitted 9/9/2022 with right upper quadrant pain, nausea, vomiting, with inability to tolerate oral intake.  She reported 20 pound weight loss in the last few weeks.  She has a history of chronic liver disease.  She reported self-medicating with vodka and orange juice 3 times a day.  She was recently seen in the ER (8/18/2022) and prescribed cefdinir for pyelonephritis, which she did not finish.  Urine culture from 8/18/2022 was positive for Streptococcus agalactiae and E. coli.    Hospital Course  Abdominal ultrasound showed hepatomegaly, no gallstones, hepatic mass or CBD dilation.  Lactic acid was 2.4, ammonia was 64, AST was 313, ALT was 93, total bilirubin was 5.9. UDS was positive for cannabinoids and opiates.      Ceftriaxone was started on admission.  MRI abdomen from 9/11/2022 showed findings consistent with pancreatitis, fluid/blood levels of the gallbladder representing sludge and hepatosplenomegaly.     Profound hypophosphatemia, hypokalemia, replaced    Interval Problem Update  Seen patient at bedside.  Patient noted restless and reports back pain. She complains hospital food, does not want to eat as she states this is \"dog food\". She does not want to eat cold food. I have discussed with RN and asked to bring warm food to her.   She has refused lactulose yesterday. No bowel movements today. Ammonia 62. Patient is noted to have intermittent hallucination.   K, phos, Mag replaced.   Sister states she will bring food.   I came to bedside again in the afternoon and advised patient to complaint with medications, take lactulose and diet. She voiced understanding.  She states her last ETOH intake was one week prior admission. She denies recreational drug use except cannabis.   Reglan? To help nausea, no EKG in chart, ordered EKG    I have discussed this patient's plan of care and " discharge plan at IDT rounds today with Case Management, Nursing, Nursing leadership, and other members of the IDT team.    Consultants/Specialty  None    Code Status  Full Code    Disposition  Patient is not medically cleared for discharge.   Anticipate discharge to to home with close outpatient follow-up.  I have placed the appropriate orders for post-discharge needs.    Review of Systems  Review of Systems   Constitutional:  Positive for malaise/fatigue.   HENT: Negative.     Eyes: Negative.    Respiratory: Negative.     Cardiovascular: Negative.    Gastrointestinal:  Positive for abdominal pain and nausea.        RUQ pain   Genitourinary: Negative.    Musculoskeletal: Negative.    Skin: Negative.    Neurological: Negative.    Endo/Heme/Allergies: Negative.    Psychiatric/Behavioral: Negative.        Physical Exam  Temp:  [36.1 °C (97 °F)-36.6 °C (97.8 °F)] 36.6 °C (97.8 °F)  Pulse:  [82-92] 85  Resp:  [16-20] 16  BP: (121-131)/(83-95) 131/83  SpO2:  [94 %-98 %] 98 %    Physical Exam  Constitutional:       General: She is in acute distress.      Appearance: She is ill-appearing.   HENT:      Head: Normocephalic.      Nose: Nose normal.      Mouth/Throat:      Mouth: Mucous membranes are moist.   Eyes:      Pupils: Pupils are equal, round, and reactive to light.   Cardiovascular:      Rate and Rhythm: Normal rate.   Pulmonary:      Effort: Pulmonary effort is normal.   Abdominal:      Tenderness: There is abdominal tenderness.   Musculoskeletal:      Cervical back: Normal range of motion.      Right lower leg: No edema.      Left lower leg: No edema.   Skin:     General: Skin is warm.   Neurological:      General: No focal deficit present.      Mental Status: She is alert.   Psychiatric:         Mood and Affect: Mood normal.       Fluids    Intake/Output Summary (Last 24 hours) at 9/14/2022 1413  Last data filed at 9/13/2022 2011  Gross per 24 hour   Intake 100 ml   Output --   Net 100 ml            Laboratory  Recent Labs     09/12/22  0639 09/13/22  0204 09/14/22  0536 09/14/22  1152   WBC 5.6 5.6 5.9  --    RBC 2.97* 2.97* 2.97*  --    HEMOGLOBIN 11.5* 11.3* 11.4*  --    HEMATOCRIT 32.4* 32.7* 33.0*  --    .1* 110.1* 111.1*  --    MCH 38.7* 38.0* 38.4*  --    MCHC 35.5* 34.6 34.5  --    RDW 58.4* 58.9* 63.4*  --    PLATELETCT 126* 128* 135* 156*   MPV 11.9 11.8 11.7  --        Recent Labs     09/13/22  0204 09/13/22  1603 09/14/22  0536   SODIUM 132* 133* 136   POTASSIUM 3.5* 3.6 3.4*   CHLORIDE 101 100 101   CO2 22 22 22   GLUCOSE 117* 107* 113*   BUN 2* 3* 5*   CREATININE 0.45* 0.47* 0.48*   CALCIUM 9.3 9.0 8.9                       Imaging  [id number]   Final Result      1.  Patient motion degraded study.      2.  Edema/inflammatory stranding within the periportal region as well as in the area of the pancreatic head and extending into the anterior pararenal space. Consideration should be given for pancreatitis.      3.  Fluid/blood level within the gallbladder likely representing presence of layering sludge.      4.  No intrahepatic biliary ductal dilatation. The common bile duct is not well seen due to inflammatory change and patient motion. No definite common duct stones were biliary ductal dilatation.      5.  Hepatosplenomegaly.      US-RUQ   Final Result      1.  No acute sonographic abnormality. No gallstones.   2.  Hepatomegaly with heterogeneous hepatic echogenicity, which may be due to regional steatosis and/or hepatocellular disease.   3.  No hepatic mass lesions.             Assessment/Plan  * Abdominal pain- (present on admission)  Assessment & Plan  Likely sec to pancreatitis given significant alcohol abuse history.   MRI abdomen from 9/11/2022 showed findings consistent with pancreatitis, fluid/blood levels of the gallbladder representing sludge.  Abd US: hepatomegaly with heterogeneous hepatic echogenicity. No hepatic mass. No gallstone    Advance diet as  tolerated  Symptomatic control      Hypophosphatemia  Assessment & Plan  Profound. Replace as indicated  Poor oral intake, ordered thiamine       Hypokalemia  Assessment & Plan  Replace as indicated    Pyelonephritis  Assessment & Plan  Patient was prescribed a 10-day course of Omnicef on 8/18/2022, which patient did not complete.  Ceftriaxone was ordered on admission.     Hypoglycemia  Assessment & Plan  Likely secondary to malnutrition and liver disease. Encourage oral intake, supplements    Nausea and vomiting  Assessment & Plan  Advance diet as tolerated  Antiemetic     High anion gap metabolic acidosis  Assessment & Plan  Likely starvation ketoacidosis with lactic acidosis.Received hydration.  Replete electrolytes.    Hypertension  Assessment & Plan  Amlodipine was started.  Continue for now, and adjust as needed    Hepatic encephalopathy (HCC)  Assessment & Plan  Still has episodes of confusion, overall slightly improving. Ammonia was 61 on 9/12/2022.  Continue lactulose, titrate as goal of 2-3 loose bowel movements per day    Alcoholic hepatitis  Assessment & Plan  Hepatitis discrimination score 17. Does not qualify for prednisolone.  Continue symptomatic treatment.  Alcohol cessation advised         VTE prophylaxis: SCDs/TEDs

## 2022-09-15 ENCOUNTER — PHARMACY VISIT (OUTPATIENT)
Dept: PHARMACY | Facility: MEDICAL CENTER | Age: 47
End: 2022-09-15
Payer: COMMERCIAL

## 2022-09-15 VITALS
TEMPERATURE: 97.2 F | DIASTOLIC BLOOD PRESSURE: 75 MMHG | OXYGEN SATURATION: 92 % | WEIGHT: 152.34 LBS | HEART RATE: 68 BPM | RESPIRATION RATE: 17 BRPM | BODY MASS INDEX: 26.15 KG/M2 | SYSTOLIC BLOOD PRESSURE: 111 MMHG

## 2022-09-15 LAB
ALBUMIN SERPL BCP-MCNC: 3.6 G/DL (ref 3.2–4.9)
ALBUMIN/GLOB SERPL: 1.2 G/DL
ALP SERPL-CCNC: 105 U/L (ref 30–99)
ALT SERPL-CCNC: 54 U/L (ref 2–50)
AMMONIA PLAS-SCNC: 53 UMOL/L (ref 11–45)
ANION GAP SERPL CALC-SCNC: 12 MMOL/L (ref 7–16)
AST SERPL-CCNC: 154 U/L (ref 12–45)
BILIRUB SERPL-MCNC: 2.1 MG/DL (ref 0.1–1.5)
BUN SERPL-MCNC: 7 MG/DL (ref 8–22)
CALCIUM SERPL-MCNC: 8.8 MG/DL (ref 8.5–10.5)
CHLORIDE SERPL-SCNC: 104 MMOL/L (ref 96–112)
CO2 SERPL-SCNC: 22 MMOL/L (ref 20–33)
CREAT SERPL-MCNC: 0.4 MG/DL (ref 0.5–1.4)
ERYTHROCYTE [DISTWIDTH] IN BLOOD BY AUTOMATED COUNT: 65.8 FL (ref 35.9–50)
GFR SERPLBLD CREATININE-BSD FMLA CKD-EPI: 123 ML/MIN/1.73 M 2
GLOBULIN SER CALC-MCNC: 3.1 G/DL (ref 1.9–3.5)
GLUCOSE SERPL-MCNC: 95 MG/DL (ref 65–99)
HCT VFR BLD AUTO: 32.4 % (ref 37–47)
HGB BLD-MCNC: 11.2 G/DL (ref 12–16)
MAGNESIUM SERPL-MCNC: 1.9 MG/DL (ref 1.5–2.5)
MCH RBC QN AUTO: 38.5 PG (ref 27–33)
MCHC RBC AUTO-ENTMCNC: 34.6 G/DL (ref 33.6–35)
MCV RBC AUTO: 111.3 FL (ref 81.4–97.8)
PHOSPHATE SERPL-MCNC: 3.1 MG/DL (ref 2.5–4.5)
PLATELET # BLD AUTO: 144 K/UL (ref 164–446)
PMV BLD AUTO: 11.5 FL (ref 9–12.9)
POTASSIUM SERPL-SCNC: 3.5 MMOL/L (ref 3.6–5.5)
PROT SERPL-MCNC: 6.7 G/DL (ref 6–8.2)
RBC # BLD AUTO: 2.91 M/UL (ref 4.2–5.4)
SODIUM SERPL-SCNC: 138 MMOL/L (ref 135–145)
WBC # BLD AUTO: 6.7 K/UL (ref 4.8–10.8)

## 2022-09-15 PROCEDURE — 700102 HCHG RX REV CODE 250 W/ 637 OVERRIDE(OP): Performed by: STUDENT IN AN ORGANIZED HEALTH CARE EDUCATION/TRAINING PROGRAM

## 2022-09-15 PROCEDURE — 84100 ASSAY OF PHOSPHORUS: CPT

## 2022-09-15 PROCEDURE — 700102 HCHG RX REV CODE 250 W/ 637 OVERRIDE(OP): Performed by: INTERNAL MEDICINE

## 2022-09-15 PROCEDURE — 85027 COMPLETE CBC AUTOMATED: CPT

## 2022-09-15 PROCEDURE — 82140 ASSAY OF AMMONIA: CPT

## 2022-09-15 PROCEDURE — 80053 COMPREHEN METABOLIC PANEL: CPT

## 2022-09-15 PROCEDURE — RXMED WILLOW AMBULATORY MEDICATION CHARGE: Performed by: STUDENT IN AN ORGANIZED HEALTH CARE EDUCATION/TRAINING PROGRAM

## 2022-09-15 PROCEDURE — 83735 ASSAY OF MAGNESIUM: CPT

## 2022-09-15 PROCEDURE — 700111 HCHG RX REV CODE 636 W/ 250 OVERRIDE (IP): Performed by: STUDENT IN AN ORGANIZED HEALTH CARE EDUCATION/TRAINING PROGRAM

## 2022-09-15 PROCEDURE — 700111 HCHG RX REV CODE 636 W/ 250 OVERRIDE (IP): Performed by: INTERNAL MEDICINE

## 2022-09-15 PROCEDURE — A9270 NON-COVERED ITEM OR SERVICE: HCPCS | Performed by: STUDENT IN AN ORGANIZED HEALTH CARE EDUCATION/TRAINING PROGRAM

## 2022-09-15 PROCEDURE — A9270 NON-COVERED ITEM OR SERVICE: HCPCS | Performed by: INTERNAL MEDICINE

## 2022-09-15 PROCEDURE — 99239 HOSP IP/OBS DSCHRG MGMT >30: CPT | Performed by: STUDENT IN AN ORGANIZED HEALTH CARE EDUCATION/TRAINING PROGRAM

## 2022-09-15 RX ORDER — LANOLIN ALCOHOL/MO/W.PET/CERES
100 CREAM (GRAM) TOPICAL DAILY
Qty: 30 TABLET | Refills: 0 | Status: SHIPPED | OUTPATIENT
Start: 2022-09-16 | End: 2022-10-16

## 2022-09-15 RX ORDER — POTASSIUM CHLORIDE 20 MEQ/1
40 TABLET, EXTENDED RELEASE ORAL ONCE
Status: COMPLETED | OUTPATIENT
Start: 2022-09-15 | End: 2022-09-15

## 2022-09-15 RX ORDER — LACTULOSE 20 G/30ML
45 SOLUTION ORAL 3 TIMES DAILY
Qty: 135 EACH | Refills: 0
Start: 2022-09-15 | End: 2022-10-15

## 2022-09-15 RX ORDER — FAMOTIDINE 20 MG/1
20 TABLET, FILM COATED ORAL 2 TIMES DAILY
Qty: 60 TABLET | Refills: 0 | Status: SHIPPED | OUTPATIENT
Start: 2022-09-15 | End: 2022-10-15

## 2022-09-15 RX ORDER — AMLODIPINE BESYLATE 5 MG/1
5 TABLET ORAL DAILY
Qty: 30 TABLET | Refills: 0 | Status: SHIPPED | OUTPATIENT
Start: 2022-09-16 | End: 2022-10-16

## 2022-09-15 RX ORDER — FOLIC ACID 1 MG/1
1 TABLET ORAL DAILY
Qty: 30 TABLET | Refills: 0 | Status: SHIPPED | OUTPATIENT
Start: 2022-09-16 | End: 2022-10-16

## 2022-09-15 RX ADMIN — FOLIC ACID 1 MG: 1 TABLET ORAL at 05:38

## 2022-09-15 RX ADMIN — SUCRALFATE 1 G: 1 TABLET ORAL at 05:38

## 2022-09-15 RX ADMIN — Medication 100 MG: at 05:38

## 2022-09-15 RX ADMIN — FAMOTIDINE 20 MG: 10 INJECTION, SOLUTION INTRAVENOUS at 05:38

## 2022-09-15 RX ADMIN — POTASSIUM CHLORIDE 40 MEQ: 1500 TABLET, EXTENDED RELEASE ORAL at 09:04

## 2022-09-15 RX ADMIN — LACTULOSE 45 ML: 20 SOLUTION ORAL at 05:38

## 2022-09-15 RX ADMIN — MORPHINE SULFATE 2 MG: 4 INJECTION INTRAVENOUS at 00:40

## 2022-09-15 RX ADMIN — THERA TABS 1 TABLET: TAB at 05:38

## 2022-09-15 ASSESSMENT — PAIN DESCRIPTION - PAIN TYPE
TYPE: ACUTE PAIN
TYPE: ACUTE PAIN

## 2022-09-15 NOTE — DISCHARGE INSTRUCTIONS
Discharge Instructions per Cassy Burns M.D.    Please stop alcohol intake--very very important!  Please take medications as prescribed. Take lactulose 45mL three times a day to goal of 2-3 loose bowel movements daily. If your bowel movements more than 3 a day, you can cut down to 30mL three times a day  Please follow up with GI as outpatient to discuss cirrhosis and hepatomegaly   Please follow-up with PCP as outpatient.    DIET: cardiac diet    ACTIVITY: As tolerated    DIAGNOSIS: abdominal pain, nausea vomiting resolved, cirrhosis, alcohol hepatitis    Return to ER in the event of new or worsening symptoms. Please note importance of compliance and the patient has agreed to proceed with all medical recommendations and follow up plan indicated above. All medications come with benefits and risks. Risks may include permanent injury or death and these risks can be minimized with close reassessment and monitoring. Please make it to your scheduled follow ups with **, M.D., and/or specialists **

## 2022-09-15 NOTE — PROGRESS NOTES
Pt. Received in bed awake, orientedx4. With complaints of flank pain radiating to abdominal area, PRN pain med not due yet at the time of assessment. Charge RN updated of pt. Concerns about pain meds. Updated pt. About the time of pain med. Call light in reach.

## 2022-09-15 NOTE — CARE PLAN
The patient is Stable - Low risk of patient condition declining or worsening    Shift Goals  Clinical Goals: tolerate diet  Patient Goals: discharge  Family Goals: None present    Progress made toward(s) clinical / shift goals:  patient progressing toward goals and stable for discharge. Patient pain at an acceptable level.    Patient is not progressing towards the following goals:

## 2022-09-15 NOTE — PROGRESS NOTES
Pt DC'd. IV removed, discharge instructions provided to patient, pt verbalizes understanding. Pt states all questions have been answered. Copy of discharge paperwork provided to pt, signed copy in chart. Pt states all belongings in possession. Meds to beds delivered, pt reports has Lactulose at home. Pt escorted off unit by volunteer without incident.

## 2022-09-15 NOTE — DISCHARGE SUMMARY
Discharge Summary    CHIEF COMPLAINT ON ADMISSION  Chief Complaint   Patient presents with    Abdominal Pain     BIB [facility] for RUQ pain, N/V, hypoglycemia. Per EMS, Pt was found to have a glucose level of 50 mg/dL, given 100 mL of D10 and glucose cornelio to 98 mg/dL on last check. Pt states she hasn't had any water or food for weeks. Complaining of jaundice. 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       Reason for Admission  Abdominal pain     Admission Date  9/9/2022    CODE STATUS  Full Code    HPI & HOSPITAL COURSE  This is a 47 y.o. female admitted 9/9/2022 with right upper quadrant pain, nausea, vomiting, with inability to tolerate oral intake.  She reported 20 pound weight loss in the last few weeks.  She has a history of chronic liver disease, alcohol abuse and mutation for hemochromatosis.  She reported self-medicating with vodka and orange juice 3 times a day.  She was recently seen in the ER (8/18/2022) and prescribed cefdinir for pyelonephritis, which she did not finish.  Urine culture from 8/18/2022 was positive for Streptococcus agalactiae and E. coli.     While she is admitted, abdominal ultrasound showed hepatomegaly, no gallstones, hepatic mass or CBD dilation.  ammonia was 64, AST was 313, ALT was 93, total bilirubin was 5.9. Lipase normal. UDS was positive for cannabinoids and opiates. MRI abdomen from 9/11/2022 showed findings consistent with pancreatitis, fluid/blood levels of the gallbladder representing sludge and hepatosplenomegaly.      She was given Ceftriaxone. Diet has been slowly advanced as tolerated. She was given lactulose for elevated ammonia. She was noted to have profound electrolyte abnormalities including hypophosphatemia, hypokalemia, hypomagnesemia, which were replaced. Her abdominal pain has been slowly improving. Diet has been advanced. On the day of discharge, she reports abdominal pain improving, ammonia 53. She is on GI soft  diet.      Therefore, she is discharged in fair and stable condition to home with close outpatient follow-up. She is advised to stop alcohol intake and take lactulose 45cc tid with goal of loose bowel movements 2-3 times daily. Follow up with PCP and GI as outpatient. Patient voiced understanding.     The patient met 2-midnight criteria for an inpatient stay at the time of discharge.    Discharge Date  9/15/2022    FOLLOW UP ITEMS POST DISCHARGE  PCP  GI    DISCHARGE DIAGNOSES  Principal Problem:    Abdominal pain POA: Yes  Active Problems:    Alcoholic hepatitis POA: Unknown    Hepatic encephalopathy (HCC) POA: Unknown    Hypertension POA: Unknown    High anion gap metabolic acidosis POA: Unknown    Nausea and vomiting POA: Unknown    Hypoglycemia POA: Unknown    Pyelonephritis POA: Unknown    Hypokalemia POA: Unknown    Hypophosphatemia POA: Unknown    Hypomagnesemia POA: Unknown  Resolved Problems:    * No resolved hospital problems. *      FOLLOW UP  Future Appointments   Date Time Provider Department Center   10/3/2022  2:30 PM David Ortiz M.D. [facility]     No follow-up provider specified.    MEDICATIONS ON DISCHARGE     Medication List        START taking these medications        Instructions   amLODIPine 5 MG Tabs  Commonly known as: NORVASC   Take 1 Tablet by mouth every day for 30 days.  Dose: 5 mg     famotidine 20 MG Tabs  Commonly known as: PEPCID   Take 1 Tablet by mouth 2 times a day for 30 days.  Dose: 20 mg     folic acid 1 MG Tabs  Commonly known as: FOLVITE   Take 1 Tablet by mouth every day for 30 days.  Dose: 1 mg     lactulose 20 GM/30ML Soln   Take 45 mL by mouth 3 times a day for 30 days.  Dose: 45 mL     thiamine 100 MG tablet  Commonly known as: THIAMINE   Take 1 Tablet by mouth every day for 30 days.  Dose: 100 mg            STOP taking these medications      cefdinir 300 MG Caps  Commonly known as: OMNICEF              Allergies  Allergies   Allergen Reactions    Penicillins  Hives    Avocado Itching and Swelling    Banana Itching and Swelling    Cantaloupe Itching and Swelling       DIET  Orders Placed This Encounter   Procedures    Diet Order Diet: Low Fiber(GI Soft)     Standing Status:   Standing     Number of Occurrences:   1     Order Specific Question:   Diet:     Answer:   Low Fiber(GI Soft) [2]       ACTIVITY  As tolerated.  Weight bearing as tolerated    CONSULTATIONS  na    PROCEDURES  na    LABORATORY  Lab Results   Component Value Date    SODIUM 138 09/15/2022    POTASSIUM 3.5 (L) 09/15/2022    CHLORIDE 104 09/15/2022    CO2 22 09/15/2022    GLUCOSE 95 09/15/2022    BUN 7 (L) 09/15/2022    CREATININE 0.40 (L) 09/15/2022        Lab Results   Component Value Date    WBC 6.7 09/15/2022    HEMOGLOBIN 11.2 (L) 09/15/2022    HEMATOCRIT 32.4 (L) 09/15/2022    PLATELETCT 144 (L) 09/15/2022      [id number]   Final Result      1.  Patient motion degraded study.      2.  Edema/inflammatory stranding within the periportal region as well as in the area of the pancreatic head and extending into the anterior pararenal space. Consideration should be given for pancreatitis.      3.  Fluid/blood level within the gallbladder likely representing presence of layering sludge.      4.  No intrahepatic biliary ductal dilatation. The common bile duct is not well seen due to inflammatory change and patient motion. No definite common duct stones were biliary ductal dilatation.      5.  Hepatosplenomegaly.      US-RUQ   Final Result      1.  No acute sonographic abnormality. No gallstones.   2.  Hepatomegaly with heterogeneous hepatic echogenicity, which may be due to regional steatosis and/or hepatocellular disease.   3.  No hepatic mass lesions.          Total time of the discharge process 36 minutes.

## 2022-09-15 NOTE — CARE PLAN
The patient is Stable - Low risk of patient condition declining or worsening    Shift Goals  Clinical Goals: diet tolerance, pain control  Patient Goals: pain and nausea control  Family Goals: None present    Progress made toward(s) clinical / shift goals:  Pain assessed during shift. Pain medication administered per MAR.    Patient is not progressing towards the following goals:

## 2022-09-15 NOTE — PROGRESS NOTES
Patient being discharged to discharge [facility] [place] [facility] prior to discharge home. Patient to have IV removed from d/c adame RN. Patient agrees to go to d/c [facility]. Awaiting meds to beds prior to d/c. Patient sister to pick patient up.

## 2022-09-19 LAB
A2 MACROGLOB SERPL-MCNC: 232 MG/DL (ref 131–293)
ALT SERPL-CCNC: 65 U/L (ref 5–40)
ANNOTATION COMMENT IMP: ABNORMAL
AST SERPL-CCNC: 182 U/L (ref 9–40)
BUN SERPL-SCNC: 6 MG/DL (ref 7–20)
CIRRHOMETER PT SCORE Q4850: 0.24
FIBROSIS STAGE SERPL QL: ABNORMAL
GGT SERPL-CCNC: 484 U/L (ref 7–33)
INFLAMETER META CLASS Q4853: ABNORMAL
INFLAMETER PT SCORE Q4852: 0.53
LIVER FIBR SCORE SERPL CALC.FIBROMETER: 0.95
PATHOLOGY STUDY: ABNORMAL
PROTHROM ACT/NOR PPP: 88 % (ref 90–120)

## 2022-11-06 ENCOUNTER — HOSPITAL ENCOUNTER (EMERGENCY)
Facility: MEDICAL CENTER | Age: 47
End: 2022-11-07
Attending: EMERGENCY MEDICINE
Payer: MEDICAID

## 2022-11-06 ENCOUNTER — APPOINTMENT (OUTPATIENT)
Dept: RADIOLOGY | Facility: MEDICAL CENTER | Age: 47
End: 2022-11-06
Attending: EMERGENCY MEDICINE
Payer: MEDICAID

## 2022-11-06 DIAGNOSIS — S20.212A CHEST WALL CONTUSION, LEFT, INITIAL ENCOUNTER: ICD-10-CM

## 2022-11-06 DIAGNOSIS — S22.41XA CLOSED FRACTURE OF MULTIPLE RIBS OF RIGHT SIDE, INITIAL ENCOUNTER: ICD-10-CM

## 2022-11-06 DIAGNOSIS — L29.9 GENERALIZED PRURITUS: ICD-10-CM

## 2022-11-06 DIAGNOSIS — S63.636A SPRAIN OF INTERPHALANGEAL JOINT OF RIGHT LITTLE FINGER, INITIAL ENCOUNTER: ICD-10-CM

## 2022-11-06 LAB
ALBUMIN SERPL BCP-MCNC: 3.9 G/DL (ref 3.2–4.9)
ALBUMIN/GLOB SERPL: 1.1 G/DL
ALP SERPL-CCNC: 93 U/L (ref 30–99)
ALT SERPL-CCNC: 75 U/L (ref 2–50)
AMMONIA PLAS-SCNC: 33 UMOL/L (ref 11–45)
ANION GAP SERPL CALC-SCNC: 15 MMOL/L (ref 7–16)
AST SERPL-CCNC: 115 U/L (ref 12–45)
BASOPHILS # BLD AUTO: 1.2 % (ref 0–1.8)
BASOPHILS # BLD: 0.12 K/UL (ref 0–0.12)
BILIRUB SERPL-MCNC: 0.7 MG/DL (ref 0.1–1.5)
BUN SERPL-MCNC: 13 MG/DL (ref 8–22)
CALCIUM SERPL-MCNC: 10 MG/DL (ref 8.5–10.5)
CHLORIDE SERPL-SCNC: 102 MMOL/L (ref 96–112)
CO2 SERPL-SCNC: 16 MMOL/L (ref 20–33)
CREAT SERPL-MCNC: 0.71 MG/DL (ref 0.5–1.4)
EOSINOPHIL # BLD AUTO: 0.47 K/UL (ref 0–0.51)
EOSINOPHIL NFR BLD: 4.8 % (ref 0–6.9)
ERYTHROCYTE [DISTWIDTH] IN BLOOD BY AUTOMATED COUNT: 56.7 FL (ref 35.9–50)
GFR SERPLBLD CREATININE-BSD FMLA CKD-EPI: 106 ML/MIN/1.73 M 2
GLOBULIN SER CALC-MCNC: 3.7 G/DL (ref 1.9–3.5)
GLUCOSE SERPL-MCNC: 100 MG/DL (ref 65–99)
HCG SERPL QL: NEGATIVE
HCT VFR BLD AUTO: 40.2 % (ref 37–47)
HGB BLD-MCNC: 13 G/DL (ref 12–16)
IMM GRANULOCYTES # BLD AUTO: 0.04 K/UL (ref 0–0.11)
IMM GRANULOCYTES NFR BLD AUTO: 0.4 % (ref 0–0.9)
LYMPHOCYTES # BLD AUTO: 3.16 K/UL (ref 1–4.8)
LYMPHOCYTES NFR BLD: 32.3 % (ref 22–41)
MCH RBC QN AUTO: 32.4 PG (ref 27–33)
MCHC RBC AUTO-ENTMCNC: 32.3 G/DL (ref 33.6–35)
MCV RBC AUTO: 100.2 FL (ref 81.4–97.8)
MONOCYTES # BLD AUTO: 0.88 K/UL (ref 0–0.85)
MONOCYTES NFR BLD AUTO: 9 % (ref 0–13.4)
NEUTROPHILS # BLD AUTO: 5.12 K/UL (ref 2–7.15)
NEUTROPHILS NFR BLD: 52.3 % (ref 44–72)
NRBC # BLD AUTO: 0 K/UL
NRBC BLD-RTO: 0 /100 WBC
PLATELET # BLD AUTO: 295 K/UL (ref 164–446)
PMV BLD AUTO: 11.6 FL (ref 9–12.9)
POTASSIUM SERPL-SCNC: 4.4 MMOL/L (ref 3.6–5.5)
PROT SERPL-MCNC: 7.6 G/DL (ref 6–8.2)
RBC # BLD AUTO: 4.01 M/UL (ref 4.2–5.4)
SODIUM SERPL-SCNC: 133 MMOL/L (ref 135–145)
WBC # BLD AUTO: 9.8 K/UL (ref 4.8–10.8)

## 2022-11-06 PROCEDURE — 80053 COMPREHEN METABOLIC PANEL: CPT

## 2022-11-06 PROCEDURE — 82140 ASSAY OF AMMONIA: CPT

## 2022-11-06 PROCEDURE — 85610 PROTHROMBIN TIME: CPT

## 2022-11-06 PROCEDURE — 85730 THROMBOPLASTIN TIME PARTIAL: CPT

## 2022-11-06 PROCEDURE — 36415 COLL VENOUS BLD VENIPUNCTURE: CPT

## 2022-11-06 PROCEDURE — 85025 COMPLETE CBC W/AUTO DIFF WBC: CPT

## 2022-11-06 PROCEDURE — 84703 CHORIONIC GONADOTROPIN ASSAY: CPT

## 2022-11-06 PROCEDURE — 96374 THER/PROPH/DIAG INJ IV PUSH: CPT

## 2022-11-06 PROCEDURE — 700111 HCHG RX REV CODE 636 W/ 250 OVERRIDE (IP): Performed by: EMERGENCY MEDICINE

## 2022-11-06 PROCEDURE — 71101 X-RAY EXAM UNILAT RIBS/CHEST: CPT | Mod: LT

## 2022-11-06 PROCEDURE — 99285 EMERGENCY DEPT VISIT HI MDM: CPT

## 2022-11-06 RX ORDER — ONDANSETRON 2 MG/ML
4 INJECTION INTRAMUSCULAR; INTRAVENOUS ONCE
Status: COMPLETED | OUTPATIENT
Start: 2022-11-07 | End: 2022-11-07

## 2022-11-06 RX ORDER — SODIUM CHLORIDE 9 MG/ML
500 INJECTION, SOLUTION INTRAVENOUS ONCE
Status: COMPLETED | OUTPATIENT
Start: 2022-11-07 | End: 2022-11-07

## 2022-11-06 RX ORDER — HYDROXYZINE HYDROCHLORIDE 25 MG/1
50 TABLET, FILM COATED ORAL ONCE
Status: COMPLETED | OUTPATIENT
Start: 2022-11-07 | End: 2022-11-07

## 2022-11-06 RX ADMIN — MORPHINE SULFATE 6 MG: 10 INJECTION INTRAVENOUS at 23:59

## 2022-11-06 ASSESSMENT — FIBROSIS 4 INDEX: FIB4 SCORE: 6.69

## 2022-11-07 ENCOUNTER — APPOINTMENT (OUTPATIENT)
Dept: RADIOLOGY | Facility: MEDICAL CENTER | Age: 47
End: 2022-11-07
Attending: EMERGENCY MEDICINE
Payer: MEDICAID

## 2022-11-07 VITALS
HEART RATE: 76 BPM | BODY MASS INDEX: 24.8 KG/M2 | RESPIRATION RATE: 16 BRPM | WEIGHT: 140 LBS | SYSTOLIC BLOOD PRESSURE: 133 MMHG | HEIGHT: 63 IN | DIASTOLIC BLOOD PRESSURE: 85 MMHG | OXYGEN SATURATION: 94 % | TEMPERATURE: 97 F

## 2022-11-07 LAB
APTT PPP: 29.4 SEC (ref 24.7–36)
INR PPP: 1.01 (ref 0.87–1.13)
PROTHROMBIN TIME: 13.2 SEC (ref 12–14.6)

## 2022-11-07 PROCEDURE — A9270 NON-COVERED ITEM OR SERVICE: HCPCS | Performed by: EMERGENCY MEDICINE

## 2022-11-07 PROCEDURE — 700102 HCHG RX REV CODE 250 W/ 637 OVERRIDE(OP): Performed by: EMERGENCY MEDICINE

## 2022-11-07 PROCEDURE — 700111 HCHG RX REV CODE 636 W/ 250 OVERRIDE (IP): Performed by: EMERGENCY MEDICINE

## 2022-11-07 PROCEDURE — 700105 HCHG RX REV CODE 258: Performed by: EMERGENCY MEDICINE

## 2022-11-07 PROCEDURE — 71260 CT THORAX DX C+: CPT

## 2022-11-07 PROCEDURE — 700117 HCHG RX CONTRAST REV CODE 255: Performed by: EMERGENCY MEDICINE

## 2022-11-07 PROCEDURE — 73120 X-RAY EXAM OF HAND: CPT | Mod: RT

## 2022-11-07 PROCEDURE — 96375 TX/PRO/DX INJ NEW DRUG ADDON: CPT | Mod: XU

## 2022-11-07 RX ORDER — OXYCODONE HYDROCHLORIDE 5 MG/1
5 TABLET ORAL ONCE
Status: COMPLETED | OUTPATIENT
Start: 2022-11-07 | End: 2022-11-07

## 2022-11-07 RX ORDER — OXYCODONE HYDROCHLORIDE 5 MG/1
5 TABLET ORAL EVERY 4 HOURS PRN
Qty: 15 TABLET | Refills: 0 | Status: SHIPPED | OUTPATIENT
Start: 2022-11-07 | End: 2022-11-09 | Stop reason: SDUPTHER

## 2022-11-07 RX ORDER — HYDROXYZINE HYDROCHLORIDE 25 MG/1
25-50 TABLET, FILM COATED ORAL EVERY 8 HOURS PRN
Qty: 15 TABLET | Refills: 0 | Status: SHIPPED | OUTPATIENT
Start: 2022-11-07 | End: 2022-11-07 | Stop reason: SDUPTHER

## 2022-11-07 RX ORDER — HYDROXYZINE HYDROCHLORIDE 25 MG/1
25-50 TABLET, FILM COATED ORAL EVERY 8 HOURS PRN
Qty: 15 TABLET | Refills: 0 | Status: SHIPPED | OUTPATIENT
Start: 2022-11-07

## 2022-11-07 RX ADMIN — IOHEXOL 100 ML: 350 INJECTION, SOLUTION INTRAVENOUS at 01:15

## 2022-11-07 RX ADMIN — OXYCODONE 5 MG: 5 TABLET ORAL at 02:36

## 2022-11-07 RX ADMIN — HYDROXYZINE HYDROCHLORIDE 50 MG: 25 TABLET, FILM COATED ORAL at 00:18

## 2022-11-07 RX ADMIN — SODIUM CHLORIDE 500 ML: 9 INJECTION, SOLUTION INTRAVENOUS at 00:02

## 2022-11-07 RX ADMIN — ONDANSETRON 4 MG: 2 INJECTION INTRAMUSCULAR; INTRAVENOUS at 00:00

## 2022-11-07 NOTE — ED TRIAGE NOTES
"Hien Huynh  47 y.o.  Chief Complaint   Patient presents with    Rib Pain    GLF     BIBA for above, pt had GLF on Thursday. Pt has hepatic encephalopathy, didn't take medications that day, states \"when I don't take them I get clumsy.\" Pt now presenting with L sided rib pain, deformity noted by EMS, bruising noted by staff. Pt has hx end stage liver failure at baseline, skin appears yellow at baseline. FSBS 129. A&Ox4 at this time.  "

## 2022-11-07 NOTE — ED PROVIDER NOTES
"ED Provider Note    ED Provider Note    Scribed for Erick Broussard MD by Erick Broussard M.D.. 11/6/2022, 11:19 PM.    Primary care provider: Pcp Pt States None  Means of arrival: EMS  History obtained from: Pt and EMS  History limited by: None    CHIEF COMPLAINT  Chief Complaint   Patient presents with    Rib Pain    GLF       HPI  Hien Huynh is a 47 y.o. female who presents to the Emergency Department for evaluation of blunt trauma to the left chest wall and the left upper quadrant of the abdomen.  Patient notes this occurred 3 days ago.  Patient has history of liver cirrhosis, she relates she is on lactulose and ran out of her medication.  Notes she \"gets clumsy\" when this happens and notes that she fell forward to the ground on Thursday.  She has a short arm cast the right upper extremity and feels she may have fallen to the ground pinning this between her and the ground.  Unknown if loss of consciousness as she does not entirely recall the event.  No active vomiting.  She has pain localized to left upper quadrant of the abdomen and the left costal margin worse with movement and respiration.  She is not anticoagulated, no hemoptysis.  No known head injury or known loss of consciousness.  Given persistence of pain she came to be assessed today.    REVIEW OF SYSTEMS  Pertinent positives include blunt trauma to the left chest wall in the left upper quadrant, history of hepatic cirrhosis, chronic generalized cutaneous pruritus no improvement with diphenhydramine. Pertinent negatives include no known loss of conscious, no vomiting, not anticoagulated.  All other systems reviewed and negative.    PAST MEDICAL HISTORY   has a past medical history of Cirrhosis (HCC), Liver disease, and Renal disorder.    SURGICAL HISTORY  patient denies any surgical history    SOCIAL HISTORY  Social History     Tobacco Use    Smoking status: Former     Types: Cigarettes     Passive exposure: Past    Smokeless " "tobacco: Never   Vaping Use    Vaping Use: Never used   Substance Use Topics    Alcohol use: Not Currently     Alcohol/week: 7.2 oz     Types: 12 Glasses of wine per week    Drug use: Yes     Comment: occ marijuana      Social History     Substance and Sexual Activity   Drug Use Yes    Comment: occ marijuana       FAMILY HISTORY  Noncontributory for trauma    CURRENT MEDICATIONS  Home Medications       Reviewed by Ximena Cho R.N. (Registered Nurse) on 11/06/22 at 2251  Med List Status: Not Addressed     Medication Last Dose Status        Patient Irving Taking any Medications                           ALLERGIES  Allergies   Allergen Reactions    Penicillins Hives    Avocado Itching and Swelling    Banana Itching and Swelling    Cantaloupe Itching and Swelling       PHYSICAL EXAM  VITAL SIGNS: BP (!) 140/101   Pulse 86   Temp 36.1 °C (96.9 °F) (Temporal)   Resp 16 Comment: Simultaneous filing. User may not have seen previous data.  Ht 1.6 m (5' 3\")   Wt 63.5 kg (140 lb)   LMP 08/22/2022 (Approximate)   SpO2 99%   BMI 24.80 kg/m²     General: Alert, mild acute distress, appears uncomfortable  Skin: Warm, dry, normal for ethnicity.  Multiple excoriations throughout, no heidy cellulitic change noted on the exposed skin.  Head: Normocephalic, atraumatic  Neck: Trachea midline, no tenderness  Eye: PERRL, normal conjunctiva, icteric sclera  ENMT: Oral mucosa moist, no pharyngeal erythema or exudate  Cardiovascular: Regular rate and rhythm, No murmur, Normal peripheral perfusion  Respiratory: Lungs CTA, respirations are non-labored, breath sounds are equal  Gastrointestinal: Soft, tenderness of the left upper quadrant the abdomen, no guarding, no rebound, no rigidity.  Musculoskeletal: Mild swelling approximately the costal margin of the left anterior chest with focal tenderness at the midclavicular line in the anterior axillary line, no step-off, no crepitus.  Neurological: Alert and oriented to person, " place, time, and situation  Lymphatics: No lymphadenopathy  Psychiatric: Cooperative, anxious, otherwise appropriate mood & affect      DIAGNOSTIC STUDIES/PROCEDURES    LABS  Results for orders placed or performed during the hospital encounter of 11/06/22   CBC WITH DIFFERENTIAL   Result Value Ref Range    WBC 9.8 4.8 - 10.8 K/uL    RBC 4.01 (L) 4.20 - 5.40 M/uL    Hemoglobin 13.0 12.0 - 16.0 g/dL    Hematocrit 40.2 37.0 - 47.0 %    .2 (H) 81.4 - 97.8 fL    MCH 32.4 27.0 - 33.0 pg    MCHC 32.3 (L) 33.6 - 35.0 g/dL    RDW 56.7 (H) 35.9 - 50.0 fL    Platelet Count 295 164 - 446 K/uL    MPV 11.6 9.0 - 12.9 fL    Neutrophils-Polys 52.30 44.00 - 72.00 %    Lymphocytes 32.30 22.00 - 41.00 %    Monocytes 9.00 0.00 - 13.40 %    Eosinophils 4.80 0.00 - 6.90 %    Basophils 1.20 0.00 - 1.80 %    Immature Granulocytes 0.40 0.00 - 0.90 %    Nucleated RBC 0.00 /100 WBC    Neutrophils (Absolute) 5.12 2.00 - 7.15 K/uL    Lymphs (Absolute) 3.16 1.00 - 4.80 K/uL    Monos (Absolute) 0.88 (H) 0.00 - 0.85 K/uL    Eos (Absolute) 0.47 0.00 - 0.51 K/uL    Baso (Absolute) 0.12 0.00 - 0.12 K/uL    Immature Granulocytes (abs) 0.04 0.00 - 0.11 K/uL    NRBC (Absolute) 0.00 K/uL   CMP   Result Value Ref Range    Sodium 133 (L) 135 - 145 mmol/L    Potassium 4.4 3.6 - 5.5 mmol/L    Chloride 102 96 - 112 mmol/L    Co2 16 (L) 20 - 33 mmol/L    Anion Gap 15.0 7.0 - 16.0    Glucose 100 (H) 65 - 99 mg/dL    Bun 13 8 - 22 mg/dL    Creatinine 0.71 0.50 - 1.40 mg/dL    Calcium 10.0 8.5 - 10.5 mg/dL    AST(SGOT) 115 (H) 12 - 45 U/L    ALT(SGPT) 75 (H) 2 - 50 U/L    Alkaline Phosphatase 93 30 - 99 U/L    Total Bilirubin 0.7 0.1 - 1.5 mg/dL    Albumin 3.9 3.2 - 4.9 g/dL    Total Protein 7.6 6.0 - 8.2 g/dL    Globulin 3.7 (H) 1.9 - 3.5 g/dL    A-G Ratio 1.1 g/dL   PT/INR   Result Value Ref Range    PT 13.2 12.0 - 14.6 sec    INR 1.01 0.87 - 1.13   AMMONIA   Result Value Ref Range    Ammonia 33 11 - 45 umol/L   HCG Qual Serum   Result Value Ref Range     Beta-Hcg Qualitative Serum Negative Negative   APTT   Result Value Ref Range    APTT 29.4 24.7 - 36.0 sec   ESTIMATED GFR   Result Value Ref Range    GFR (CKD-EPI) 106 >60 mL/min/1.73 m 2      All labs reviewed by me.      RADIOLOGY  DX-HAND 2- RIGHT   Final Result      No acute osseous abnormality.      CT-CHEST,ABDOMEN,PELVIS WITH   Final Result      1.  Subacute fractures of the right anterior fourth and fifth ribs. No pneumothorax.   2.  Age indeterminate left L3 transverse process fracture, possibly acute.   3.  No other evidence of acute traumatic injury in the chest, abdomen or pelvis.   4.  Several small scattered groundglass pulmonary opacities, postinfectious/postinflammatory. No lobar or segmental consolidation.   5.  Hepatic steatosis.   6.  Borderline splenomegaly.   7.  Colonic diverticulosis.      [id number] (WITH 1-VIEW CXR) LEFT   Final Result      No displaced rib fractures or pneumothorax.        The radiologist's interpretation of all radiological studies have been reviewed by me.    COURSE & MEDICAL DECISION MAKING  Pertinent Labs & Imaging studies reviewed. (See chart for details)    11:19 PM - Patient seen and examined at bedside. Patient will be treated with morphine/Zofran, oral Atarax for her pruritus. Ordered trauma CT of chest/abdomen/pelvis as well as metabolic work-up and ammonia level, coags to evaluate her symptoms. The differential diagnoses include but are not limited to: Rib fracture, splenic injury, pulmonary contusion, pneumothorax, metabolic encephalopathy    0130: Patient reassessed, some improvement with her pruritus with the hydroxyzine.  I have updated her with reassuring imaging.  Surprisingly fractures noted to the right anterior fourth and fifth ribs, no evidence of a pneumothorax, importantly no evidence of splenic laceration.  Patient relates injury to the right hand about a week and a half ago and was diagnosed with boxer's fracture.  She would like this  "assessed at this time as well.  She is still slightly tender on exam of the fifth metacarpal and has painful range of motion with regard to flexion.  As such repeat images are indicated.  Patient will need to follow-up in the orthopedic clinic.    0210: Patient updated with reassuring x-ray, no evidence of acute fracture.  We will steffanie tape the little finger to the ring finger for comfort.    HYDRATION: Based on the patient's presentation of Dehydration the patient was given IV fluids. IV Hydration was used because oral hydration failed due to initially n.p.o. pending imaging. Upon recheck following hydration, the patient was doing better, her skin tone is improving with IV fluids.      Patient Vitals for the past 24 hrs:   BP Temp Temp src Pulse Resp SpO2 Height Weight   11/06/22 2246 (!) 140/101 36.1 °C (96.9 °F) Temporal 86 16 99 % 1.6 m (5' 3\") 63.5 kg (140 lb)        Decision Making:  This is a 47 y.o. year old female who presents with mechanical ground-level fall on Thursday.  She takes lactulose for liver cirrhosis.  Reassuringly ammonia level is normal today, her mentation is quite clear and she is neurovascular intact.  For significant pain to the left costal margin in the left upper quadrant of the abdomen.  Given mechanism of injury and limited history significant risk factors including liver disease there is indication for CT imaging.  This is reassuring, she does have rib fractures but no evidence of pneumothorax nor splenic lack.  No indication for emergent surgical intervention.    I reviewed prescription monitoring program for patient's narcotic use before prescribing a scheduled drug.The patient will not drink alcohol nor drive with prescribed medications      In prescribing controlled substances to this patient, I certify that I have obtained and reviewed the medical history this patient I have also made a good clem effort to obtain applicable records from other providers who have treated the " patient and records did not demonstrate any increased risk of substance abuse that would prevent me from prescribing controlled substances.     I have conducted a physical exam and documented it. I have reviewed Ms. Huynh’s prescription history as maintained by the [place] Prescription Monitoring Program.     I have assessed the patient’s risk for abuse, dependency, and addiction using the validated Opioid Risk Tool available at https://www.mdcalc.com/rhenzj-srtg-nxsw-ort-narcotic-abuse.     Given the above, I believe the benefits of controlled substance therapy outweigh the risks. The reasons for prescribing controlled substances include in my professional opinion, controlled substances are a reasonable choice for this patient. Accordingly, I have discussed the risk and benefits, treatment plan, and alternative therapies with the patient. The patient has been consented for the medication and understands the risks.     I reviewed prescription monitoring program for patient's narcotic use before prescribing a scheduled drug.The patient will not drink alcohol nor drive with prescribed medications. The patient will return for new or worsening symptoms and is stable at the time of discharge.    Patient has had high blood pressure while in the emergency department, felt likely secondary to medical condition. Counseled patient to monitor blood pressure at home and follow up with primary care physician.      DISPOSITION:  Patient will be discharged home in stable condition.    FOLLOW UP:  Sarah Walls M.D.  [address]  [place] [id number] [phone]    Schedule an appointment as soon as possible for a visit       OUTPATIENT MEDICATIONS:  New Prescriptions    HYDROXYZINE HCL (ATARAX) 25 MG TAB    Take 1-2 Tablets by mouth every 8 hours as needed for Itching.    OXYCODONE IMMEDIATE-RELEASE (ROXICODONE) 5 MG TAB    Take 1 Tablet by mouth every four hours as needed for Severe Pain for up to 5 days.          FINAL  IMPRESSION  1. Closed fracture of multiple ribs of right side, initial encounter    2. Generalized pruritus    3. Chest wall contusion, left, initial encounter    4. Sprain of interphalangeal joint of right little finger, initial encounter          Erick HERNDON M.D. (Scribe), am scribing for, and in the presence of, Erick Broussard MD.    Electronically signed by: Erick Broussard M.D. (Scribe), 11/6/2022    IErick MD personally performed the services described in this documentation, as scribed by Erick Broussard M.D. in my presence, and it is both accurate and complete    The note accurately reflects work and decisions made by me.  Erick Broussard M.D.  11/7/2022  2:07 AM

## 2022-11-07 NOTE — DISCHARGE PLANNING
Medical Social Work     SW received a call from the RN requesting SW assistance with taxi voucher to [facility]. The pt does not have money or anyone to pick her up. SW provided a taxi voucher for the pt to get to [facility].

## 2022-11-07 NOTE — ED NOTES
PIV removed, catheter intact. Discharge education provided. Discharge paperwork signed by pt. Prescription to be picked up by pt. All questions answered. All belongings with pt. Pt ambulated to lobby unassisted with steady gait.     Patient given cab voucher for transport to [facility].

## 2022-11-09 ENCOUNTER — APPOINTMENT (OUTPATIENT)
Dept: RADIOLOGY | Facility: MEDICAL CENTER | Age: 47
End: 2022-11-09
Attending: STUDENT IN AN ORGANIZED HEALTH CARE EDUCATION/TRAINING PROGRAM
Payer: COMMERCIAL

## 2022-11-09 ENCOUNTER — HOSPITAL ENCOUNTER (EMERGENCY)
Facility: MEDICAL CENTER | Age: 47
End: 2022-11-09
Attending: STUDENT IN AN ORGANIZED HEALTH CARE EDUCATION/TRAINING PROGRAM
Payer: COMMERCIAL

## 2022-11-09 VITALS
WEIGHT: 140 LBS | SYSTOLIC BLOOD PRESSURE: 139 MMHG | DIASTOLIC BLOOD PRESSURE: 89 MMHG | HEART RATE: 84 BPM | TEMPERATURE: 98.5 F | HEIGHT: 64 IN | BODY MASS INDEX: 23.9 KG/M2 | RESPIRATION RATE: 18 BRPM | OXYGEN SATURATION: 96 %

## 2022-11-09 DIAGNOSIS — S20.212A CHEST WALL CONTUSION, LEFT, INITIAL ENCOUNTER: ICD-10-CM

## 2022-11-09 DIAGNOSIS — N64.4 BREAST PAIN: ICD-10-CM

## 2022-11-09 DIAGNOSIS — S22.41XA CLOSED FRACTURE OF MULTIPLE RIBS OF RIGHT SIDE, INITIAL ENCOUNTER: ICD-10-CM

## 2022-11-09 PROCEDURE — 96375 TX/PRO/DX INJ NEW DRUG ADDON: CPT

## 2022-11-09 PROCEDURE — 700101 HCHG RX REV CODE 250: Performed by: STUDENT IN AN ORGANIZED HEALTH CARE EDUCATION/TRAINING PROGRAM

## 2022-11-09 PROCEDURE — 99285 EMERGENCY DEPT VISIT HI MDM: CPT

## 2022-11-09 PROCEDURE — 71046 X-RAY EXAM CHEST 2 VIEWS: CPT

## 2022-11-09 PROCEDURE — 700111 HCHG RX REV CODE 636 W/ 250 OVERRIDE (IP): Performed by: STUDENT IN AN ORGANIZED HEALTH CARE EDUCATION/TRAINING PROGRAM

## 2022-11-09 PROCEDURE — 96374 THER/PROPH/DIAG INJ IV PUSH: CPT

## 2022-11-09 RX ORDER — OXYCODONE HYDROCHLORIDE 5 MG/1
5 TABLET ORAL EVERY 4 HOURS PRN
Qty: 15 TABLET | Refills: 0 | Status: SHIPPED | OUTPATIENT
Start: 2022-11-09 | End: 2022-11-14

## 2022-11-09 RX ORDER — LIDOCAINE 50 MG/G
1 PATCH TOPICAL EVERY 24 HOURS
Qty: 10 PATCH | Refills: 0 | Status: SHIPPED | OUTPATIENT
Start: 2022-11-09

## 2022-11-09 RX ORDER — IBUPROFEN 600 MG/1
600 TABLET ORAL EVERY 6 HOURS PRN
Qty: 30 TABLET | Refills: 0 | Status: SHIPPED | OUTPATIENT
Start: 2022-11-09 | End: 2022-11-09 | Stop reason: SDUPTHER

## 2022-11-09 RX ORDER — KETOROLAC TROMETHAMINE 30 MG/ML
15 INJECTION, SOLUTION INTRAMUSCULAR; INTRAVENOUS ONCE
Status: COMPLETED | OUTPATIENT
Start: 2022-11-09 | End: 2022-11-09

## 2022-11-09 RX ORDER — METHOCARBAMOL 500 MG/1
500 TABLET, FILM COATED ORAL 3 TIMES DAILY
Qty: 30 TABLET | Refills: 0 | Status: SHIPPED | OUTPATIENT
Start: 2022-11-09 | End: 2022-11-09

## 2022-11-09 RX ORDER — IBUPROFEN 600 MG/1
600 TABLET ORAL EVERY 6 HOURS PRN
Qty: 30 TABLET | Refills: 0 | Status: SHIPPED | OUTPATIENT
Start: 2022-11-09

## 2022-11-09 RX ORDER — MORPHINE SULFATE 4 MG/ML
4 INJECTION INTRAVENOUS ONCE
Status: COMPLETED | OUTPATIENT
Start: 2022-11-09 | End: 2022-11-09

## 2022-11-09 RX ORDER — LIDOCAINE 50 MG/G
1 PATCH TOPICAL EVERY 24 HOURS
Qty: 10 PATCH | Refills: 0 | Status: SHIPPED | OUTPATIENT
Start: 2022-11-09 | End: 2022-11-09 | Stop reason: SDUPTHER

## 2022-11-09 RX ORDER — LIDOCAINE 50 MG/G
1 PATCH TOPICAL EVERY 24 HOURS
Status: DISCONTINUED | OUTPATIENT
Start: 2022-11-09 | End: 2022-11-09 | Stop reason: HOSPADM

## 2022-11-09 RX ADMIN — LIDOCAINE 1 PATCH: 50 PATCH TOPICAL at 01:10

## 2022-11-09 RX ADMIN — MORPHINE SULFATE 4 MG: 4 INJECTION INTRAVENOUS at 01:10

## 2022-11-09 RX ADMIN — KETOROLAC TROMETHAMINE 15 MG: 30 INJECTION, SOLUTION INTRAMUSCULAR; INTRAVENOUS at 01:10

## 2022-11-09 ASSESSMENT — FIBROSIS 4 INDEX: FIB4 SCORE: 2.07

## 2022-11-09 NOTE — ED NOTES
Pt aox4, vss, nad, ambulatory steady  Pt understood all dc info and when to seek medical care, no further questions  Pt given cab voucher, cab called  20g rac iv taken out, tip intact   1

## 2022-11-09 NOTE — ED PROVIDER NOTES
CHIEF COMPLAINT  Chief Complaint   Patient presents with    Breast Pain     BIBA for L sided breast pain  Pt states 7 days ago she had  GLF and fell onto her R side, was diagnosed with rib fractures  Since then she has been having L sided breast pain, denies pain on the R side   Pt has known breast augmentation rupture of both sides, saline filled, she has known this for approx 13 years but has never had pain like this before  States feeling a popping sensation of the L breast when moving or taking deep breaths  With EMS, given 50 fent iv  Hx cirrhosis        HPI  Hien Huynh is a 47 y.o. female who presents for evaluation of left chest wall pain.  Patient had a mechanical fall 7 days ago landing onto her right and left side.  She was seen in the emergency department on 11/6/2022 had a CT chest abdomen pelvis at that time which did show subacute fractures of her fourth fifth ribs no pain on her right side.  Stated she presented during that visit for pain near her left breast implant, states her left breast implant did rupture several years ago though has not followed with plastic surgery and the pain did seem to be worse after the fall.  States the pain has been constant since her fall 7 days ago, she was given a prescription for oxycodone during her previous visit which she has been unable to fill.  Called 911 this evening when she stated she was no longer able to tolerate the pain.  She has no associated shortness of breath cough hemoptysis diaphoresis nausea or vomiting.  States the pain is isolated to her left breast implant.  Pain is worse with movement described as sharp moderate in severity nonradiating no other alleviating exacerbating factors.    REVIEW OF SYSTEMS  See HPI for further details. All other systems are negative.     PAST MEDICAL HISTORY   has a past medical history of Cirrhosis (HCC), Liver disease, and Renal disorder.    SOCIAL HISTORY  Social History     Tobacco Use    Smoking status:  "Former     Types: Cigarettes     Passive exposure: Past    Smokeless tobacco: Never   Vaping Use    Vaping Use: Never used   Substance and Sexual Activity    Alcohol use: Not Currently     Alcohol/week: 7.2 oz     Types: 12 Glasses of wine per week    Drug use: Yes     Comment: occ marijuana    Sexual activity: Not on file       SURGICAL HISTORY  patient denies any surgical history    CURRENT MEDICATIONS  Home Medications       Reviewed by Chano Baker R.N. (Registered Nurse) on 11/09/22 at 0044  Med List Status: Not Addressed     Medication Last Dose Status   hydrOXYzine HCl (ATARAX) 25 MG Tab  Active   oxyCODONE immediate-release (ROXICODONE) 5 MG Tab  Active                    ALLERGIES  Allergies   Allergen Reactions    Penicillins Hives    Avocado Itching and Swelling    Banana Itching and Swelling    Cantaloupe Itching and Swelling       FAMILY HISTORY  No pertinent family history    PHYSICAL EXAM   BP (!) 157/79   Pulse (!) 103   Temp 37.1 °C (98.8 °F)   Resp 20   Ht 1.626 m (5' 4\")   Wt 63.5 kg (140 lb)   LMP 08/22/2022 (Approximate)   SpO2 97%   BMI 24.03 kg/m²  @LEIGHANN[[id number]::@   Pulse ox interpretation: I interpret this pulse ox as normal.  VITALS - vital signs documented prior to this note have been reviewed and noted,  GENERAL - awake, alert, oriented, GCS 15, no apparent distress, non-toxic  appearing  HEENT - normocephalic, atraumatic, pupils equal, sclera anicteric, mucus  membranes moist  NECK - supple, no meningismus, full active range of motion, trachea midline  CARDIOVASCULAR - regular rate/rhythm, no murmurs/gallops/rubs  PULMONARY - no respiratory distress, speaking in full sentences, clear to  auscultation bilaterally, no wheezing/ronchi/rales, no accessory muscle use  Chest:tenderness with palpation of left chest to the inframammary line, with a mobile mass.  No rashes contusions lesions abrasions   GASTROINTESTINAL - soft, non-tender, non-distended, no rebound, guarding,  or " peritonitis  GENITOURINARY - Deferred  NEUROLOGIC - Awake alert, normal mental status, speech fluid, cognition  normal, moves all extremities  MUSCULOSKELETAL - no obvious asymmetry or deformities present  EXTREMITIES - warm, well-perfused, no cyanosis or significant edema  DERMATOLOGIC - warm, dry, no rashes, no jaundice  PSYCHIATRIC - normal affect, normal insight, normal concentration            EKG      LABS      Labs Reviewed - No data to display      Pertinent Labs & Imaging studies reviewed. (See chart for details)    RADIOLOGY  DX-CHEST-2 VIEWS    (Results Pending)             ED COURSE/PROCEDURES      Reevaluation at 0300 patient states pain is improved    Medications   morphine 4 MG/ML injection 4 mg (has no administration in time range)   ketorolac (TORADOL) injection 15 mg (has no administration in time range)   lidocaine (LIDODERM) 5 % 1 Patch (has no administration in time range)               MEDICAL DECISION MAKING    Patient presented for evaluation of pain near her left breast after mechanical fall 7 days ago.  Does have tenderness with palpation of her left breast implant near the inframammary line.  The pain seems isolated to the patient's previously ruptured breast implant.  It is reproducible on examination. Given that she recently had a CT chest abdomen pelvis for this pain and repeat chest x-ray shows no evidence of pneumothorax pleural effusion or pneumothorax this evening, I do not see an indication for repeat CT scanning.  Had an EKG obtained prior to my assessment which was nonischemic.  She has no cough hemoptysis shortness of breath or hypoxia no PE risk factors. Overall based on the history physical exam and totality of information present, I estimate there is low risk for acute coronary syndrome pulmonary embolism pneumothorax ruptured esophagus or thoracic aortic dissection, or other more serious etiology of her discomfort at this time believe this likely secondary to the patient's  fall, and prior ruptured breast implant and contusion.  She has a prescription for oxycodone waiting for her at the pharmacy her pain was controlled after Toradol morphine and a lidocaine patch.  We will continue Motrin, and lidocaine patches.  Patient does have an unfilled Percocet prescription waiting [facility] however she states her insurance is no longer accepted there and requested this to be changed to a different pharmacies which was done this evening.  Recommend she follow-up with plastic surgery.  All pertinent return precautions were discussed and the patient was discharged in stable condition.    FINAL IMPRESSION  1.  Left chest wall pain           Electronically signed by: Adin Francois D.O., 11/9/2022 12:59 AM      Dictation Disclaimer  Please note this report has been produced using speech recognition software and  may contain errors related to that system, including errors seen in grammar,  punctuation and spelling, as well as words and phrases that may be inappropriate.  If there are any questions or concerns, please feel free to contact the dictating  physician for clarification.

## 2022-11-09 NOTE — ED NOTES
Pt pharmacy changed per request. Pt provided updated d/c papers and ambulated to lobby for d/c. Cab company called

## 2022-11-09 NOTE — ED NOTES
"Subjective:       Patient ID: Becky Riley is a 36 y.o. female.    Vitals:  height is 5' 2" (1.575 m) and weight is 54.4 kg (120 lb). Her temporal temperature is 97.8 °F (36.6 °C). Her blood pressure is 101/61 and her pulse is 89. Her respiration is 16 and oxygen saturation is 99%.     Chief Complaint: Dysuria    Dysuria   This is a new problem. Episode onset: 2 days ago. The problem has been unchanged. The pain is at a severity of 0/10. The patient is experiencing no pain. There has been no fever. She is sexually active. Associated symptoms include frequency and urgency. Pertinent negatives include no chills, hematuria, nausea, vomiting or rash. Treatments tried: AZO's. The treatment provided no relief.       Constitution: Negative for chills and fever.   Neck: Negative for painful lymph nodes.   Gastrointestinal: Negative for abdominal pain, nausea and vomiting.   Genitourinary: Positive for frequency and urgency. Negative for dysuria, urine decreased, hematuria, history of kidney stones, painful menstruation, irregular menstruation, missed menses, heavy menstrual bleeding, ovarian cysts, genital trauma, vaginal pain, vaginal discharge, vaginal bleeding, vaginal odor, painful intercourse, genital sore, painful ejaculation and pelvic pain.   Musculoskeletal: Negative for back pain.   Skin: Negative for rash and lesion.   Hematologic/Lymphatic: Negative for swollen lymph nodes.       Objective:      Physical Exam   Constitutional: She is oriented to person, place, and time. She appears well-developed.   HENT:   Head: Normocephalic and atraumatic.   Ears:   Right Ear: External ear normal.   Left Ear: External ear normal.   Nose: Nose normal. No nasal deformity. No epistaxis.   Eyes: Lids are normal.   Neck: Trachea normal, normal range of motion and phonation normal. Neck supple.   Cardiovascular: Normal pulses.   Pulmonary/Chest: Effort normal and breath sounds normal. No stridor. No respiratory distress. " Pt to xray   She has no decreased breath sounds. She has no wheezes. She has no rhonchi. She has no rales.   Abdominal: Soft. Normal appearance and bowel sounds are normal. She exhibits no distension. There is no abdominal tenderness. There is no left CVA tenderness and no right CVA tenderness.   Neurological: She is alert and oriented to person, place, and time. She has intact cranial nerves.      Comments: CN's grossly intact   Skin: Skin is warm, dry and intact. Psychiatric: Her speech is normal and behavior is normal.   Nursing note and vitals reviewed.          Results for orders placed or performed in visit on 12/22/20   POCT Urinalysis, Dipstick, Automated, W/O Scope   Result Value Ref Range    POC Blood, Urine Negative Negative    POC Bilirubin, Urine Positive (A) Negative    POC Urobilinogen, Urine 4.0 (A) 0.1 - 1.1    POC Ketones, Urine Negative Negative    POC Protein, Urine Positive (A) Negative    POC Nitrates, Urine Positive (A) Negative    POC Glucose, Urine Positive (A) Negative    pH, UA 5.5     POC Specific Gravity, Urine 1.020 1.003 - 1.029    POC Leukocytes, Urine Negative Negative       Results reviewed with pt  Assessment:       1. Dysuria    2. Acute cystitis without hematuria        Plan:         Dysuria  -     POCT Urinalysis, Dipstick, Automated, W/O Scope    Acute cystitis without hematuria  -     nitrofurantoin, macrocrystal-monohydrate, (MACROBID) 100 MG capsule; Take 1 capsule (100 mg total) by mouth 2 (two) times daily. for 5 days  Dispense: 10 capsule; Refill: 0         Patient Instructions       Understanding Urinary Tract Infections (UTIs)  Most UTIs are caused by bacteria, although they may also be caused by viruses or fungi. Bacteria from the bowel are the most common source of infection. The infection may start because of any of the following:  · Sexual activity. During sex, bacteria can travel from the penis, vagina, or rectum into the urethra.   · Bacteria on the skin outside the rectum may  travel into the urethra. This is more common in women since the rectum and urethra are closer to each other than in men. Wiping from front to back after using the toilet and keeping the area clean can help prevent germs from getting to the urethra.  · Blockage of urine flow through the urinary tract. If urine sits too long, germs may start to grow out of control.      Parts of the urinary tract  The infection can occur in any part of the urinary tract.  · The kidneys collect and store urine.  · The ureters carry urine from the kidneys to the bladder.  · The bladder holds urine until you are ready to let it out.  · The urethra carries urine from the bladder out of the body. It is shorter in women, so bacteria can move through it more easily. The urethra is longer in men, so a UTI is less likely to reach the bladder or kidneys in men.  Date Last Reviewed: 1/1/2017  © 8615-8187 [employer]. [address] [place] [address]. All rights reserved. This information is not intended as a substitute for professional medical care. Always follow your healthcare professional's instructions.        Dysuria     Painful urination (dysuria) is often caused by a problem in the urinary tract.   Dysuria is pain felt during urination. It is often described as a burning. Learn more about this problem and how it can be treated.  What causes dysuria?  Possible causes include:  · Infection with a bacteria or virus such as a urinary tract infection (UTI or a sexually transmitted infection (STI)  · Sensitivity or allergy to chemicals such as those found in lotions and other products  · Prostate or bladder problems  · Radiation therapy to the pelvic area  How is dysuria diagnosed?  Your healthcare provider will examine you. He or she will ask about your symptoms and health. After talking with you and doing a physical exam, your healthcare provider may know what is causing your dysuria. He or she will usually request  a  "sample of your urine. Tests of your urine, or a "urinalysis," are done. A urinalysis may include:  · Looking at the urine sample (visual exam)  · Checking for substances (chemical exam)  · Looking at a small amount under a microscope (microscopic exam)  Some parts of the urinalysis may be done in the provider's office and some in a lab. And, the urine sample may be checked for bacteria and yeast (urine culture). Your healthcare provider will tell you more about these tests if they are needed.  How is dysuria treated?  Treatment depends on the cause. If you have a bacterial infection, you may need antibiotics. You may be given medicines to make it easier for you to urinate and help relieve pain. Your healthcare provider can tell you more about your treatment options. Untreated, symptoms may get worse.  When to call your healthcare provider  Call the healthcare provider right away if you have any of the following:  · Fever of 100.4°F (38°C) or higher   · No improvement after three days of treatment  · Trouble urinating because of pain  · New or increased discharge from the vagina or penis  · Rash or joint pain  · Increased back or abdominal pain  · Enlarged painful lymph nodes (lumps) in the groin   Date Last Reviewed: 1/1/2017  © 5236-7339 [employer]. [address] [place] [address], [place] [address]. All rights reserved. This information is not intended as a substitute for professional medical care. Always follow your healthcare professional's instructions.      You must understand that you've received an Urgent Care treatment only and that you may be released before all your medical problems are known or treated. You, the patient, will arrange for follow up care as instructed.    Follow up with your PCP or specialty clinic as directed in the next 1-2 weeks if not improved or as needed. You can call [phone] to schedule an appointment with the appropriate provider.    If your condition worsens we " recommend that you receive another evaluation at the emergency room immediately or contact your primary medical clinic's after hours call service to discuss your concerns.    Please go to the Emergency Department for any concerns or worsening of condition.

## 2022-11-09 NOTE — DISCHARGE PLANNING
Medical Social Work     SW received a call from the RN requesting SW assistance with providing a taxi voucher for the pt to get back to [facility]. PATRICK provided a taxi voucher for the pt.

## 2022-11-09 NOTE — DISCHARGE INSTRUCTIONS
Follow-up with plastic surgery take the Motrin lidocaine patches and Percocet as needed for pain return with any other new or concerning symptoms.

## 2022-11-09 NOTE — ED TRIAGE NOTES
"Chief Complaint   Patient presents with    Breast Pain     BIBA for L sided breast pain  Pt states 7 days ago she had  GLF and fell onto her R side, was diagnosed with rib fractures  Since then she has been having L sided breast pain, denies pain on the R side   Pt has known breast augmentation rupture of both sides, saline filled, she has known this for approx 13 years but has never had pain like this before  States feeling a popping sensation of the L breast when moving or taking deep breaths  With EMS, given 50 fent iv  Hx cirrhosis      BP (!) 157/79   Pulse (!) 103   Temp 37.1 °C (98.8 °F)   Resp 20   Ht 1.626 m (5' 4\")   Wt 63.5 kg (140 lb)   LMP 08/22/2022 (Approximate)   SpO2 97%   BMI 24.03 kg/m²     "